# Patient Record
Sex: MALE | Race: WHITE | NOT HISPANIC OR LATINO | Employment: FULL TIME | URBAN - METROPOLITAN AREA
[De-identification: names, ages, dates, MRNs, and addresses within clinical notes are randomized per-mention and may not be internally consistent; named-entity substitution may affect disease eponyms.]

---

## 2017-03-17 ENCOUNTER — CONVERSION ENCOUNTER (OUTPATIENT)
Dept: FAMILY MEDICINE CLINIC | Facility: CLINIC | Age: 37
End: 2017-03-17

## 2017-03-17 LAB
ALBUMIN SERPL-MCNC: 4.5 G/DL (ref 3.6–5.1)
ALBUMIN/GLOB SERPL: ABNORMAL {RATIO} (ref 1–2.5)
ALP SERPL-CCNC: 75 UNITS/L (ref 40–115)
ALT SERPL-CCNC: 15 UNITS/L (ref 9–46)
AST SERPL-CCNC: 32 UNITS/L (ref 10–40)
BASOPHILS # BLD AUTO: ABNORMAL 10*3/MM3 (ref 0–200)
BASOPHILS NFR BLD AUTO: 0.9 %
BILIRUB SERPL-MCNC: 0.7 MG/DL (ref 0.2–1.2)
BILIRUB UR QL STRIP: NEGATIVE
BUN SERPL-MCNC: 12 MG/DL (ref 7–25)
BUN/CREAT SERPL: ABNORMAL (ref 6–22)
CALCIUM SERPL-MCNC: 10 MG/DL (ref 8.6–10.3)
CHLORIDE SERPL-SCNC: 105 MMOL/L (ref 98–110)
CHOLEST SERPL-MCNC: 147 MG/DL (ref 125–200)
CHOLEST/HDLC SERPL: ABNORMAL {RATIO}
CO2 CONTENT VENOUS: 28 MMOL/L (ref 20–31)
COLOR UR: YELLOW
CONV BACTERIA IN URINE MICRO: ABNORMAL /HPF
CONV HYALINE CASTS IN URINE MICRO: ABNORMAL
CONV NEUTROPHILS/100 LEUKOCYTES IN BODY FLUID BY MANUAL COUNT: 57.3 %
CONV PROTEIN IN URINE BY AUTOMATED TEST STRIP: NEGATIVE
CONV TOTAL PROTEIN: 7.6 G/DL (ref 6.1–8.1)
CREAT UR-MCNC: 0.98 MG/DL (ref 0.6–1.35)
EOSINOPHIL # BLD AUTO: 2.3 %
EOSINOPHIL # BLD AUTO: ABNORMAL 10*3/MM3 (ref 15–500)
ERYTHROCYTE [DISTWIDTH] IN BLOOD BY AUTOMATED COUNT: 13.9 % (ref 11–15)
GLOBULIN UR ELPH-MCNC: ABNORMAL G/DL (ref 1.9–3.7)
GLUCOSE SERPL-MCNC: 90 MG/DL (ref 65–99)
GLUCOSE UR QL: NEGATIVE G/DL
HCT VFR BLD AUTO: 46.1 % (ref 38.5–50)
HDLC SERPL-MCNC: 47 MG/DL
HGB BLD-MCNC: 15.3 G/DL (ref 13.2–17.1)
HGB UR QL STRIP: ABNORMAL
KETONES UR QL STRIP: NEGATIVE
LDLC SERPL CALC-MCNC: ABNORMAL MG/DL
LYMPHOCYTES # BLD AUTO: ABNORMAL 10*3/MM3 (ref 850–3900)
LYMPHOCYTES NFR BLD AUTO: 35 %
MCH RBC QN AUTO: 29.9 PG (ref 27–33)
MCHC RBC AUTO-ENTMCNC: ABNORMAL % (ref 32–36)
MCV RBC AUTO: 90.3 FL (ref 80–100)
MONOCYTES # BLD AUTO: ABNORMAL 10*3/MICROLITER (ref 200–950)
MONOCYTES NFR BLD AUTO: 4.5 %
NEUTROPHILS # BLD AUTO: ABNORMAL 10*3/MM3 (ref 1500–7800)
PH UR STRIP.AUTO: 5.5 [PH] (ref 5–8)
PLATELET # BLD AUTO: ABNORMAL 10*3/MM3 (ref 140–400)
PMV BLD AUTO: 7.3 FL (ref 7.5–12.5)
POTASSIUM SERPL-SCNC: 5.2 MMOL/L (ref 3.5–5.3)
PSA SERPL-MCNC: 0.3 NG/ML
RBC # BLD AUTO: ABNORMAL 10*6/MM3 (ref 4.2–5.8)
RBC #/AREA URNS HPF: ABNORMAL /[HPF]
SODIUM SERPL-SCNC: 141 MMOL/L (ref 135–146)
SP GR UR: 1.03 (ref 1–1.03)
SQUAMOUS #/AREA URNS HPF: ABNORMAL /HPF
TRIGL SERPL-MCNC: 77 MG/DL
WBC # BLD AUTO: ABNORMAL K/UL (ref 3.8–10.8)
WBC #/AREA URNS HPF: ABNORMAL CELLS/HPF

## 2019-06-04 ENCOUNTER — CONVERSION ENCOUNTER (OUTPATIENT)
Dept: FAMILY MEDICINE CLINIC | Facility: CLINIC | Age: 39
End: 2019-06-04

## 2019-06-04 ENCOUNTER — HOSPITAL ENCOUNTER (OUTPATIENT)
Dept: FAMILY MEDICINE CLINIC | Facility: CLINIC | Age: 39
Setting detail: SPECIMEN
Discharge: HOME | End: 2019-06-04
Attending: FAMILY MEDICINE | Admitting: FAMILY MEDICINE

## 2019-06-04 VITALS
WEIGHT: 203 LBS | OXYGEN SATURATION: 98 % | RESPIRATION RATE: 16 BRPM | HEART RATE: 85 BPM | SYSTOLIC BLOOD PRESSURE: 143 MMHG | DIASTOLIC BLOOD PRESSURE: 85 MMHG

## 2019-06-06 LAB
25(OH)D3+25(OH)D2 SERPL-MCNC: 25.1 NG/ML (ref 30–100)
ALBUMIN SERPL-MCNC: 4.9 G/DL (ref 3.5–5.5)
ALBUMIN/GLOB SERPL: 2 {RATIO} (ref 1.2–2.2)
ALP SERPL-CCNC: 73 IU/L (ref 39–117)
ALT SERPL-CCNC: 17 IU/L (ref 0–44)
AMBIG ABBREV CMP14 DEFAULT: NORMAL
AMBIG ABBREV LP DEFAULT: NORMAL
AST SERPL-CCNC: 37 IU/L (ref 0–40)
BASOPHILS # BLD AUTO: 0 X10E3/UL (ref 0–0.2)
BASOPHILS NFR BLD AUTO: 1 %
BILIRUB SERPL-MCNC: 0.7 MG/DL (ref 0–1.2)
BUN SERPL-MCNC: 11 MG/DL (ref 6–20)
BUN/CREAT SERPL: 12 (ref 9–20)
CALCIUM SERPL-MCNC: 9.4 MG/DL (ref 8.7–10.2)
CHLORIDE SERPL-SCNC: 102 MMOL/L (ref 96–106)
CHOLEST SERPL-MCNC: 134 MG/DL (ref 100–199)
CO2 SERPL-SCNC: 22 MMOL/L (ref 20–29)
CREAT SERPL-MCNC: 0.92 MG/DL (ref 0.76–1.27)
EOSINOPHIL # BLD AUTO: 0.2 X10E3/UL (ref 0–0.4)
EOSINOPHIL NFR BLD AUTO: 3 %
ERYTHROCYTE [DISTWIDTH] IN BLOOD BY AUTOMATED COUNT: 13.8 % (ref 12.3–15.4)
GLOBULIN SER CALC-MCNC: 2.4 G/DL (ref 1.5–4.5)
GLUCOSE SERPL-MCNC: 47 MG/DL (ref 65–99)
HCT VFR BLD AUTO: 44.1 % (ref 37.5–51)
HDLC SERPL-MCNC: 43 MG/DL
HGB BLD-MCNC: 14.8 G/DL (ref 13–17.7)
IMM GRANULOCYTES # BLD AUTO: 0 X10E3/UL (ref 0–0.1)
IMM GRANULOCYTES NFR BLD AUTO: 0 %
LDLC SERPL CALC-MCNC: 73 MG/DL (ref 0–99)
LYMPHOCYTES # BLD AUTO: 2 X10E3/UL (ref 0.7–3.1)
LYMPHOCYTES NFR BLD AUTO: 33 %
MCH RBC QN AUTO: 30.6 PG (ref 26.6–33)
MCHC RBC AUTO-ENTMCNC: 33.6 G/DL (ref 31.5–35.7)
MCV RBC AUTO: 91 FL (ref 79–97)
MONOCYTES # BLD AUTO: 0.4 X10E3/UL (ref 0.1–0.9)
MONOCYTES NFR BLD AUTO: 6 %
NEUTROPHILS # BLD AUTO: 3.6 X10E3/UL (ref 1.4–7)
NEUTROPHILS NFR BLD AUTO: 57 %
PLATELET # BLD AUTO: 245 X10E3/UL (ref 150–450)
POTASSIUM SERPL-SCNC: 4.4 MMOL/L (ref 3.5–5.2)
PROT SERPL-MCNC: 7.3 G/DL (ref 6–8.5)
RBC # BLD AUTO: 4.84 X10E6/UL (ref 4.14–5.8)
SODIUM SERPL-SCNC: 143 MMOL/L (ref 134–144)
TRIGL SERPL-MCNC: 88 MG/DL (ref 0–149)
TSH SERPL DL<=0.005 MIU/L-ACNC: 1.59 UIU/ML (ref 0.45–4.5)
VLDLC SERPL CALC-MCNC: 18 MG/DL (ref 5–40)
WBC # BLD AUTO: 6.2 X10E3/UL (ref 3.4–10.8)

## 2019-06-06 NOTE — PROGRESS NOTES
Visit Type:  Annual Physical  Referring Provider:  Kristine Pfeiffer MD  Primary Provider:  Kristine Pfeiffer MD    CC:  well exam.    History of Present Illness:                   This is 37 y/o male who is coming in today for his annual wellness exam. He is on lexapro 10mg and he feels that his symptoms of depression and anxiety and getting worse and he wonders if the dose of his medication can be increased. Patient   denies any chest pain, shortness of breath, dizziness, headaches, nausea, vomiting, or diarrhea. Patient denies any visual problems, weakness, numbness, or tingling. No swelling reported. Patient has good appetite and denies any weight loss. No rash   reported. He denies any urinary issues.      Past Medical History:     Reviewed history from 04/27/2017 and no changes required:        External hemorrhoids (2013)        Depression/Anxiety Disorder        Allergic Rhinitis    Past Surgical History:     Reviewed history from 09/27/2013 and no changes required:        TMJ surgery 1997        Varicocele surgery  1997        Tonsillectomy as a child        External hemorrhoidectomy (2/12/13)    Family History Summary:      Reviewed history Last on 05/09/2018 and no changes required:06/04/2019  Mother - Has Family History of Heart Disease - MVP - Entered On: 8/14/2015    General Comments - FH:  FH MVP (mother)    Social History:      with 3 children     Works [employer],      Quit smoking in 2009        Risk Factors:     Smoked Tobacco Use:  Former smoker  Smokeless Tobacco Use:  Never  Passive smoke exposure:  no  Drug use:  no  Alcohol use:  no  Seatbelt use:  100 %  Sun Exposure:  occasionally      Review of Systems     General       Denies fever, chills and fatigue.    ENT       Denies earache, nasal congestion, sore throat and Post-nasal drainage.    CV       Denies near fainting, chest pain or discomfort, racing/skipping heart beats, lightheadedness, shortness of breath  with exertion, palpitations and swelling of hands or feet.    Resp       Denies shortness of breath, chest discomfort and wheezing.    GI       Denies indigestion, nausea, vomiting and abdominal pain.           Denies dysuria, urinary frequency and urinary hesitancy.    MS       Denies joint pain, joint swelling, back pain and stiffness.    Derm       Denies dryness, itching and rash.    Neuro       Denies poor balance, headaches, numbness, tingling and weakness.    Psych       Complains of anxiety and depression.       Denies mental problems, easily irritated and frequent crying.    Endo       Denies cold intolerance, heat intolerance, excessive urination and excessive   thirst.    Heme       Denies bleeding, skin discoloration and fevers.    Allergy       Denies hives or rash and seasonal allergies.      Vital Signs:    Patient Profile:    38 Years Old Male  Height:     73.75 inches  Weight:     203 pounds  BMI:        26.24     O2 Sat:     98 %  Temp:       98.5 degrees F oral  Pulse rate: 85 / minute  Pulse rhythm:   regular  Resp:       16 per minute  BP Sittin / 85  (left arm)    Cuff size:  regular      Problems: Active problems were reviewed with the patient during this visit.  Medications: Medications were reviewed with the patient during this visit.  Allergies: Allergies were reviewed with the patient during this visit.  No Known Allergy.        Vitals Entered By: Dali NARAYAN (2019 10:59 AM)      Physical Exam    General:      well developed, well nourished, in no acute distress.    Head:      normocephalic and atraumatic.    Eyes:      PERRL/EOM intact, conjunctiva and sclera clear with out nystagmus.    Ears:      TM's intact and clear with normal canals with grossly normal hearing.    Mouth:      no deformity or lesions with good dentition.    Neck:      no masses, thyromegaly, or abnormal cervical nodes.    Lungs:      clear to auscultation bilaterally. No crackles or wheezes noted,  good air movement. No retractions or accessory muscle use.    Heart:      non-displaced PMI, chest non-tender; regular rate and rhythm, S1, S2 without murmurs, rubs, or gallops  Abdomen:      soft, non-tender, non-distended, positive bowel sounds. No rebound tenderness, no guarding. No organomegaly noted. Normal percussion.    Msk:      no deformity or scoliosis noted of thoracic or lumbar spine.    Extremities:      no pedal edema.    Neurologic:      CN II-XII gross intact.    Skin:      intact without lesions or rashes.    Cervical Nodes:      no significant adenopathy.    Psych:      alert and cooperative; normal mood and affect; normal attention span and concentration.        Blood Pressure:  Today's BP: 143/85 mm Hg    Labwork:   Most Recent Lab Results:   LDL: 85 MG/DL (CALC) mg/dL 03/16/2017    Active Medications (reviewed today):  LEXAPRO 10 MG ORAL TABLET (ESCITALOPRAM OXALATE) TAKE 1 TABLET BY MOUTH DAILY    Current Allergies (reviewed today):  No known allergies        Impression & Recommendations:    Problem # 1:  PREVENTIVE HEALTH CARE (ICD-V70.0) (JQM80-F37.00)  Assessment: Unchanged  Wellness exam was done today - see above for details. I will get labs. I will increase his Lexapro to 20mg a day. Healthy life style was discussed and encouraged today. Patient was encouraged to exercise regularly and increase PO water intake for good   health. Patient also advised to take multivitamin a day.    Orders:  [facility] VITAMIN D TOTAL (VITD)  [facility] THYROID STIMULATING HORMONE (TSH) (TSH)  [facility] CBC W/DIFF; PATH REVIEW IF INDICATED (CBC)  [facility] LIPID PANEL (LIPID)  [facility] COMPREHENSIVE METABOLIC PANEL (CMP) (MPC)  Est. Well Exam 18-39 yrs. ([address]10)      Medications Added to Medication List This Visit:  1)  Escitalopram Oxalate 20 Mg Oral Tablet (Escitalopram oxalate) .... Take 1 tablet by mouth daily        Patient Instructions:  1)  Discussed importance of regular exercise and recommended starting or continuing a regular  exercise program for good health.  2)  Please schedule a follow-up appointment in 1 year.          Medications:  ESCITALOPRAM OXALATE 20 MG ORAL TABLET (ESCITALOPRAM OXALATE) Take 1 tablet by mouth daily  #90[Tablet] x 3      Entered and Authorized by:  Kristine Pfeiffer MD      Electronically signed by:   Kristine Pfeiffer MD on 06/04/2019      Method used:    Electronically to               [id number]* (retail)              [address]              [place]  [address]              Ph: [phone]              Fax: [phone]      Note to Pharmacy: Route: ORAL;       RxID:   [id number]                Medication Administration    Orders Added:  1)  [facility] VITAMIN D TOTAL [VITD]  2)  [facility] THYROID STIMULATING HORMONE (TSH) [TSH]  3)  [facility] CBC W/DIFF; PATH REVIEW IF INDICATED [CBC]  4)  [facility] LIPID PANEL [LIPID]  5)  [facility] COMPREHENSIVE METABOLIC PANEL (CMP) [MPC]  6)  Est. Well Exam 18-39 yrs. [[id number]]        Electronically signed by Kristine Pfeiffer MD on 06/04/2019 at 12:01 PM  ________________________________________________________________________       Disclaimer: Converted Note message may not contain all data elements that existed in the legacy source system. Please see [facility] [employer] [facility] for the original note details.

## 2019-07-15 ENCOUNTER — OFFICE VISIT (OUTPATIENT)
Dept: FAMILY MEDICINE CLINIC | Facility: CLINIC | Age: 39
End: 2019-07-15

## 2019-07-15 VITALS
WEIGHT: 199 LBS | HEART RATE: 75 BPM | BODY MASS INDEX: 26.37 KG/M2 | OXYGEN SATURATION: 99 % | TEMPERATURE: 98.6 F | SYSTOLIC BLOOD PRESSURE: 130 MMHG | DIASTOLIC BLOOD PRESSURE: 83 MMHG | RESPIRATION RATE: 16 BRPM | HEIGHT: 73 IN

## 2019-07-15 DIAGNOSIS — J20.9 ACUTE BRONCHITIS, UNSPECIFIED ORGANISM: Primary | ICD-10-CM

## 2019-07-15 DIAGNOSIS — J30.2 SEASONAL ALLERGIC RHINITIS, UNSPECIFIED TRIGGER: ICD-10-CM

## 2019-07-15 DIAGNOSIS — R05.9 COUGH: ICD-10-CM

## 2019-07-15 PROBLEM — Z00.00 ENCOUNTER FOR GENERAL ADULT MEDICAL EXAMINATION WITHOUT ABNORMAL FINDINGS: Status: ACTIVE | Noted: 2018-05-09

## 2019-07-15 PROBLEM — F32.A DEPRESSION: Status: ACTIVE | Noted: 2019-07-15

## 2019-07-15 PROBLEM — F41.9 ANXIETY DISORDER: Status: ACTIVE | Noted: 2019-07-15

## 2019-07-15 PROCEDURE — 99214 OFFICE O/P EST MOD 30 MIN: CPT | Performed by: FAMILY MEDICINE

## 2019-07-15 RX ORDER — ESCITALOPRAM OXALATE 20 MG/1
20 TABLET ORAL DAILY
Refills: 2 | COMMUNITY
Start: 2019-07-03 | End: 2020-05-29

## 2019-07-15 RX ORDER — AMOXICILLIN AND CLAVULANATE POTASSIUM 875; 125 MG/1; MG/1
1 TABLET, FILM COATED ORAL 2 TIMES DAILY
Qty: 20 TABLET | Refills: 0 | Status: SHIPPED | OUTPATIENT
Start: 2019-07-15 | End: 2019-07-25

## 2019-07-15 NOTE — PROGRESS NOTES
Subjective   Chief Complaint   Patient presents with   • Cough   • Wheezing     Antonio Evans is a 39 y.o. male.     Patient Care Team:  Provider, No Known as PCP - General  Provider, No Known as PCP - Family Medicine    He is coming in today complaining about cough and congestion.  He reports that he has been sick for at least a week.  His symptoms started when he was traveling out of town.  He was seen at urgent care last week and he was diagnosed with upper respiratory infection and he was started on Z-Terence, prednisone and was given some cough medicine.  He finished his antibiotic and he does not feel any better.  In fact he says he feels worse.  He feels that his symptoms have moved to the chest.  He feels congested and feels wheezing in his chest at times.  He feels little bit achy and chilly but he denies any fever.  He also feels some congestion in his sinuses.  He denies any sore throat, nausea, vomiting, or diarrhea.  He has good appetite and he is able to drink just fine.  No rashes reported.  No sick contacts reported.         The following portions of the patient's history were reviewed and updated as appropriate: allergies, current medications, past family history, past medical history, past social history, past surgical history and problem list.  Past Medical History:   Diagnosis Date   • Allergic rhinitis    • Anxiety    • Hemorrhoids      Past Surgical History:   Procedure Laterality Date   • HEMORRHOIDECTOMY     • TEMPOROMANDIBULAR JOINT ARTHROPLASTY  1997   • TONSILLECTOMY     • VARICOCELE EXCISION  1997     The patient has a family history of  Family History   Problem Relation Age of Onset   • Mitral valve prolapse Mother      Social History     Socioeconomic History   • Marital status:      Spouse name: Not on file   • Number of children: Not on file   • Years of education: Not on file   • Highest education level: Not on file   Tobacco Use   • Smoking status: Never Smoker   • Smokeless  "tobacco: Never Used   Substance and Sexual Activity   • Alcohol use: Yes   • Drug use: No   • Sexual activity: Yes       Review of Systems   Constitutional: Positive for fatigue. Negative for activity change, appetite change, chills and fever.   HENT: Positive for congestion. Negative for ear pain, hearing loss, mouth sores, postnasal drip, sinus pressure, sore throat and voice change.    Respiratory: Positive for cough. Negative for choking, chest tightness, shortness of breath, wheezing and stridor.    Cardiovascular: Negative for chest pain.   Gastrointestinal: Negative for diarrhea, nausea and vomiting.   Skin: Negative for dry skin and rash.   Allergic/Immunologic: Positive for environmental allergies. Negative for food allergies.     Visit Vitals  /83 (BP Location: Left arm, Patient Position: Sitting, Cuff Size: Adult)   Pulse 75   Temp 98.6 °F (37 °C) (Oral)   Resp 16   Ht 185.4 cm (73\")   Wt 90.3 kg (199 lb)   SpO2 99%   BMI 26.25 kg/m²       Current Outpatient Medications:   •  amoxicillin-clavulanate (AUGMENTIN) 875-125 MG per tablet, Take 1 tablet by mouth 2 (Two) Times a Day for 10 days., Disp: 20 tablet, Rfl: 0  •  escitalopram (LEXAPRO) 20 MG tablet, Take 20 mg by mouth Daily., Disp: , Rfl: 2  •  Promethazine-Phenyleph-Codeine (PROMETHAZINE VC/CODEINE) 6.25-5-10 MG/5ML syrup, Take 5 mL by mouth 3 (Three) Times a Day As Needed (cough)., Disp: 120 mL, Rfl: 0    Objective   Physical Exam   Constitutional: He appears well-developed and well-nourished. No distress.   HENT:   Head: Normocephalic and atraumatic.   Right Ear: External ear normal.   Left Ear: External ear normal.   Mouth/Throat: Oropharynx is clear and moist. No oropharyngeal exudate.   Dry cough and congestion noted.   Eyes: Conjunctivae and EOM are normal. Pupils are equal, round, and reactive to light.   Neck: Normal range of motion. Neck supple.   Cardiovascular: Normal rate, regular rhythm, normal heart sounds and intact distal " pulses.   Pulmonary/Chest: Effort normal and breath sounds normal. No respiratory distress. He has no wheezes. He has no rales.   Skin: Skin is warm and dry. No rash noted.   Nursing note and vitals reviewed.               Lab Results   Component Value Date    GLU 47 (L) 06/05/2019    BUN 11 06/05/2019    CREATININE 0.92 06/05/2019    EGFRIFNONA 104 06/05/2019    EGFRIFAFRI 121 06/05/2019     06/05/2019    K 4.4 06/05/2019     06/05/2019    CALCIUM 9.4 06/05/2019    ALBUMIN 4.9 06/05/2019    BILITOT 0.7 06/05/2019    ALKPHOS 73 06/05/2019    AST 37 06/05/2019    ALT 17 06/05/2019    CHLPL 134 06/05/2019    TRIG 88 06/05/2019    HDL 43 06/05/2019    VLDL 18 06/05/2019    LDL 73 06/05/2019    WBC 6.2 06/05/2019    RBC 4.84 06/05/2019    HCT 44.1 06/05/2019    MCV 91 06/05/2019    MCH 30.6 06/05/2019    TSH 1.590 06/05/2019    AZDS85RW 25.1 (L) 06/05/2019          Assessment/Plan   Problems Addressed this Visit        Respiratory    Acute bronchitis - Primary    Relevant Medications    Promethazine-Phenyleph-Codeine (PROMETHAZINE VC/CODEINE) 6.25-5-10 MG/5ML syrup    Other Relevant Orders    XR Chest 2 View    Cough    Seasonal allergic rhinitis      I suspect that his symptoms are due to acute bronchitis.  He failed treatment with azithromycin and prednisone.  I will start him on Augmentin and I also gave him prescription for stronger cough medicine.  I will get a chest x-ray to rule out early pneumonia.  All this was discussed with him in details.  Cough prevents him from resting well at night so that given cough medicine should help with that.  He was advised to take plenty of rest and keep up with fluid intake.  Some of his symptoms might be possibly due to seasonal allergies which he has history off and they do not seem to be well-controlled.  He was advised to take over-the-counter allergy medications as needed.         Requested Prescriptions     Signed Prescriptions Disp Refills   •  amoxicillin-clavulanate (AUGMENTIN) 875-125 MG per tablet 20 tablet 0     Sig: Take 1 tablet by mouth 2 (Two) Times a Day for 10 days.   • Promethazine-Phenyleph-Codeine (PROMETHAZINE VC/CODEINE) 6.25-5-10 MG/5ML syrup 120 mL 0     Sig: Take 5 mL by mouth 3 (Three) Times a Day As Needed (cough).

## 2020-05-29 RX ORDER — ESCITALOPRAM OXALATE 20 MG/1
TABLET ORAL
Qty: 90 TABLET | Refills: 0 | Status: SHIPPED | OUTPATIENT
Start: 2020-05-29 | End: 2020-06-09 | Stop reason: SDUPTHER

## 2020-06-09 ENCOUNTER — OFFICE VISIT (OUTPATIENT)
Dept: FAMILY MEDICINE CLINIC | Facility: CLINIC | Age: 40
End: 2020-06-09

## 2020-06-09 VITALS
RESPIRATION RATE: 16 BRPM | TEMPERATURE: 97.8 F | BODY MASS INDEX: 26.51 KG/M2 | OXYGEN SATURATION: 97 % | WEIGHT: 200 LBS | DIASTOLIC BLOOD PRESSURE: 80 MMHG | HEART RATE: 73 BPM | HEIGHT: 73 IN | SYSTOLIC BLOOD PRESSURE: 130 MMHG

## 2020-06-09 DIAGNOSIS — Z00.00 ENCOUNTER FOR GENERAL ADULT MEDICAL EXAMINATION WITHOUT ABNORMAL FINDINGS: Primary | ICD-10-CM

## 2020-06-09 PROBLEM — J20.9 ACUTE BRONCHITIS: Status: RESOLVED | Noted: 2019-07-15 | Resolved: 2020-06-09

## 2020-06-09 PROBLEM — R05.9 COUGH: Status: RESOLVED | Noted: 2019-07-15 | Resolved: 2020-06-09

## 2020-06-09 PROCEDURE — 99396 PREV VISIT EST AGE 40-64: CPT | Performed by: FAMILY MEDICINE

## 2020-06-09 RX ORDER — ESCITALOPRAM OXALATE 20 MG/1
20 TABLET ORAL DAILY
Qty: 90 TABLET | Refills: 2 | Status: SHIPPED | OUTPATIENT
Start: 2020-06-09 | End: 2020-08-23

## 2020-06-09 NOTE — PROGRESS NOTES
Subjective   Chief Complaint   Patient presents with   • Annual Exam     Antonio Evans is a 39 y.o. male.     Patient Care Team:  Kristine Pfeiffer MD as PCP - General (Family Medicine)    He is coming in today for his annual wellness exam.  He reports doing well and denies any new problems.  He has been working from home for quite some time even before the pandemic started.  He is on Lexapro for his issues with some anxiety and depression and he feels that this medicine is still working well for him and wants to continue it.  He is trying to work on his diet and physical activity as well.  Patient denies any chest pain, shortness of breath, dizziness, nausea, vomiting, or diarrhea. No visual issues reported. No headaches, numbness or tingling. No urinary issues. Patient has good appetite and denies any weight changes. No swelling reported. No emotional issues.         The following portions of the patient's history were reviewed and updated as appropriate: allergies, current medications, past family history, past medical history, past social history, past surgical history and problem list.  Past Medical History:   Diagnosis Date   • Allergic rhinitis    • Anxiety    • Hemorrhoids      Past Surgical History:   Procedure Laterality Date   • HEMORRHOIDECTOMY     • TEMPOROMANDIBULAR JOINT ARTHROPLASTY  1997   • TONSILLECTOMY     • VARICOCELE EXCISION  1997     The patient has a family history of  Family History   Problem Relation Age of Onset   • Mitral valve prolapse Mother      Social History     Socioeconomic History   • Marital status:      Spouse name: Not on file   • Number of children: Not on file   • Years of education: Not on file   • Highest education level: Not on file   Tobacco Use   • Smoking status: Never Smoker   • Smokeless tobacco: Never Used   Substance and Sexual Activity   • Alcohol use: Yes   • Drug use: No   • Sexual activity: Yes       Review of Systems   Constitutional: Negative for  "activity change, appetite change, chills, fatigue, fever, unexpected weight gain and unexpected weight loss.   HENT: Negative for congestion, ear pain, hearing loss, nosebleeds, postnasal drip, swollen glands, tinnitus and trouble swallowing.    Eyes: Negative for blurred vision, double vision and visual disturbance.   Respiratory: Negative for cough, choking, chest tightness, shortness of breath, wheezing and stridor.    Cardiovascular: Negative for chest pain, palpitations and leg swelling.   Gastrointestinal: Negative for abdominal distention, abdominal pain, anal bleeding, constipation, diarrhea, nausea, vomiting and GERD.   Endocrine: Negative for cold intolerance, heat intolerance and polyphagia.   Genitourinary: Negative for dysuria, flank pain, frequency, hematuria and urgency.   Musculoskeletal: Negative for arthralgias, back pain, gait problem, joint swelling and neck pain.   Skin: Negative for color change, dry skin and skin lesions.   Allergic/Immunologic: Negative for environmental allergies and food allergies.   Neurological: Negative for dizziness, tremors, speech difficulty, light-headedness, numbness, headache and confusion.   Hematological: Negative for adenopathy. Does not bruise/bleed easily.   Psychiatric/Behavioral: Negative for decreased concentration, sleep disturbance, depressed mood and stress.     Visit Vitals  /80 (BP Location: Left arm, Patient Position: Sitting, Cuff Size: Large Adult)   Pulse 73   Temp 97.8 °F (36.6 °C) (Temporal)   Resp 16   Ht 185.4 cm (73\")   Wt 90.7 kg (200 lb)   SpO2 97%   BMI 26.39 kg/m²       Current Outpatient Medications:   •  escitalopram (LEXAPRO) 20 MG tablet, Take 1 tablet by mouth Daily., Disp: 90 tablet, Rfl: 2    Objective   Physical Exam   Constitutional: He is oriented to person, place, and time. He appears well-developed and well-nourished. No distress.   HENT:   Head: Normocephalic and atraumatic.   Right Ear: External ear normal.   Left Ear: " External ear normal.   Mouth/Throat: Oropharynx is clear and moist.   Eyes: Pupils are equal, round, and reactive to light. Conjunctivae and EOM are normal. Right eye exhibits no discharge. Left eye exhibits no discharge. No scleral icterus.   Neck: Normal range of motion. Neck supple. No JVD present. No thyromegaly present.   Cardiovascular: Normal rate, regular rhythm, normal heart sounds and intact distal pulses. Exam reveals no gallop and no friction rub.   No murmur heard.  Pulmonary/Chest: Effort normal and breath sounds normal. No respiratory distress. He has no wheezes. He has no rales.   Abdominal: Soft. Bowel sounds are normal. He exhibits no distension and no mass. There is no tenderness. There is no rebound and no guarding. No hernia.   Musculoskeletal: Normal range of motion. He exhibits no edema, tenderness or deformity.   Lymphadenopathy:     He has no cervical adenopathy.   Neurological: He is alert and oriented to person, place, and time. He displays normal reflexes. No cranial nerve deficit or sensory deficit.   Skin: Skin is warm and dry. Capillary refill takes less than 2 seconds. No rash noted. He is not diaphoretic. No erythema. No pallor.   Psychiatric: He has a normal mood and affect. His behavior is normal. Judgment normal.   Nursing note and vitals reviewed.           No visits with results within 7 Day(s) from this visit.   Latest known visit with results is:   Orders Only on 06/05/2019   Component Date Value Ref Range Status   • WBC 06/05/2019 6.2  3.4 - 10.8 x10E3/uL Final   • RBC 06/05/2019 4.84  4.14 - 5.80 x10E6/uL Final   • Hemoglobin 06/05/2019 14.8  13.0 - 17.7 g/dL Final   • Hematocrit 06/05/2019 44.1  37.5 - 51.0 % Final   • MCV 06/05/2019 91  79 - 97 fL Final   • MCH 06/05/2019 30.6  26.6 - 33.0 pg Final   • MCHC 06/05/2019 33.6  31.5 - 35.7 g/dL Final   • RDW 06/05/2019 13.8  12.3 - 15.4 % Final   • Platelets 06/05/2019 245  150 - 450 x10E3/uL Final   • Neutrophil Rel %  06/05/2019 57  Not Estab. % Final   • Lymphocyte Rel % 06/05/2019 33  Not Estab. % Final   • Monocyte Rel % 06/05/2019 6  Not Estab. % Final   • Eosinophil Rel % 06/05/2019 3  Not Estab. % Final   • Basophil Rel % 06/05/2019 1  Not Estab. % Final   • Neutrophils Absolute 06/05/2019 3.6  1.4 - 7.0 x10E3/uL Final   • Lymphocytes Absolute 06/05/2019 2.0  0.7 - 3.1 x10E3/uL Final   • Monocytes Absolute 06/05/2019 0.4  0.1 - 0.9 x10E3/uL Final   • Eosinophils Absolute 06/05/2019 0.2  0.0 - 0.4 x10E3/uL Final   • Basophils Absolute 06/05/2019 0.0  0.0 - 0.2 x10E3/uL Final   • Immature Granulocyte Rel % 06/05/2019 0  Not Estab. % Final   • Immature Grans Absolute 06/05/2019 0.0  0.0 - 0.1 x10E3/uL Final   • Glucose 06/05/2019 47* 65 - 99 mg/dL Final    Comment: Specimen received in contact with cells. No visible hemolysis  present. However GLUC may be decreased and K increased. Clinical  correlation indicated.     • BUN 06/05/2019 11  6 - 20 mg/dL Final   • Creatinine 06/05/2019 0.92  0.76 - 1.27 mg/dL Final   • eGFR Non African Am 06/05/2019 104  >59 mL/min/1.73 Final   • eGFR African Am 06/05/2019 121  >59 mL/min/1.73 Final   • BUN/Creatinine Ratio 06/05/2019 12  9 - 20 Final   • Sodium 06/05/2019 143  134 - 144 mmol/L Final   • Potassium 06/05/2019 4.4  3.5 - 5.2 mmol/L Final    Comment: Specimen received in contact with cells. No visible hemolysis  present. However GLUC may be decreased and K increased. Clinical  correlation indicated.     • Chloride 06/05/2019 102  96 - 106 mmol/L Final   • Total CO2 06/05/2019 22  20 - 29 mmol/L Final   • Calcium 06/05/2019 9.4  8.7 - 10.2 mg/dL Final   • Total Protein 06/05/2019 7.3  6.0 - 8.5 g/dL Final   • Albumin 06/05/2019 4.9  3.5 - 5.5 g/dL Final   • Globulin 06/05/2019 2.4  1.5 - 4.5 g/dL Final   • A/G Ratio 06/05/2019 2.0  1.2 - 2.2 Final   • Total Bilirubin 06/05/2019 0.7  0.0 - 1.2 mg/dL Final   • Alkaline Phosphatase 06/05/2019 73  39 - 117 IU/L Final   • AST (SGOT)  06/05/2019 37  0 - 40 IU/L Final   • ALT (SGPT) 06/05/2019 17  0 - 44 IU/L Final   • Total Cholesterol 06/05/2019 134  100 - 199 mg/dL Final   • Triglycerides 06/05/2019 88  0 - 149 mg/dL Final   • HDL Cholesterol 06/05/2019 43  >39 mg/dL Final   • VLDL Cholesterol 06/05/2019 18  5 - 40 mg/dL Final   • LDL Cholesterol  06/05/2019 73  0 - 99 mg/dL Final   • TSH 06/05/2019 1.590  0.450 - 4.500 uIU/mL Final   • 25 Hydroxy, Vitamin D 06/05/2019 25.1* 30.0 - 100.0 ng/mL Final    Comment: Vitamin D deficiency has been defined by the [place] [employer] and an Endocrine Society practice guideline as a  level of serum 25-OH vitamin D less than 20 ng/mL (1,2).  The Endocrine Society went on to further define vitamin D  insufficiency as a level between 21 and 29 ng/mL (2).  1. IOM ([place] [employer]). 2010. Dietary reference     intakes for calcium and D. [place]: The     National Academies Press.  2. Mary MF, Renuka NEVAREZ, Cabrera ZHOU, et al.     Evaluation, treatment, and prevention of vitamin D     deficiency: an Endocrine Society clinical practice     guideline. JCEM. 2011 Jul; 96(7):1911-30.     • Art Trujillo CMP14 Default 06/05/2019 Comment   Final    Comment: A hand-written panel/profile was received from your office. In  accordance with the [facility] Ambiguous Test Code Policy dated July 2003, we have completed your order by using the closest currently  or formerly recognized AMA panel.  We have assigned Comprehensive  Metabolic Panel (14), Test Code #084559 to this request.  If this  is not the testing you wished to receive on this specimen, please  contact the [facility] Client Inquiry/Technical Services Department  to clarify the test order.  We appreciate your business.     • Art Trujillo LP Default 06/05/2019 Comment   Final    Comment: A hand-written panel/profile was received from your office. In  accordance with the [facility] Ambiguous Test Code Policy dated July 2003, we have completed your  order by using the closest currently  or formerly recognized AMA panel.  We have assigned Lipid Panel,  Test Code #573788 to this request. If this is not the testing you  wished to receive on this specimen, please contact the [facility]  Client Inquiry/Technical Services Department to clarify the test  order.  We appreciate your business.                   Assessment/Plan   Problems Addressed this Visit        Other    Encounter for general adult medical examination without abnormal findings - Primary    Relevant Orders    CBC Auto Differential    Comprehensive Metabolic Panel    TSH    Lipid Panel        Wellness exam was done today - see above for details. Healthy life style was reviewed and discussed and re-enforced. Regular exercise and healthy diet were also discussed and recommended.  I will be getting fasting blood work.  He will continue Lexapro for the treatment of his depression anxiety has medication is working well for him and no side effects are reported.  Refill was given today.             Requested Prescriptions     Signed Prescriptions Disp Refills   • escitalopram (LEXAPRO) 20 MG tablet 90 tablet 2     Sig: Take 1 tablet by mouth Daily.

## 2020-06-22 ENCOUNTER — LAB (OUTPATIENT)
Dept: FAMILY MEDICINE CLINIC | Facility: CLINIC | Age: 40
End: 2020-06-22

## 2020-06-22 ENCOUNTER — OFFICE VISIT (OUTPATIENT)
Dept: FAMILY MEDICINE CLINIC | Facility: CLINIC | Age: 40
End: 2020-06-22

## 2020-06-22 VITALS
DIASTOLIC BLOOD PRESSURE: 78 MMHG | HEIGHT: 73 IN | TEMPERATURE: 97.7 F | OXYGEN SATURATION: 98 % | WEIGHT: 197 LBS | BODY MASS INDEX: 26.11 KG/M2 | HEART RATE: 80 BPM | RESPIRATION RATE: 16 BRPM | SYSTOLIC BLOOD PRESSURE: 128 MMHG

## 2020-06-22 DIAGNOSIS — L05.91 PILONIDAL CYST: Primary | ICD-10-CM

## 2020-06-22 LAB
ALBUMIN SERPL-MCNC: 4.8 G/DL (ref 3.5–5.2)
ALBUMIN/GLOB SERPL: 1.7 G/DL
ALP SERPL-CCNC: 71 U/L (ref 39–117)
ALT SERPL W P-5'-P-CCNC: 13 U/L (ref 1–41)
ANION GAP SERPL CALCULATED.3IONS-SCNC: 9.2 MMOL/L (ref 5–15)
AST SERPL-CCNC: 43 U/L (ref 1–40)
BASOPHILS # BLD AUTO: 0.05 10*3/MM3 (ref 0–0.2)
BASOPHILS NFR BLD AUTO: 0.7 % (ref 0–1.5)
BILIRUB SERPL-MCNC: 0.7 MG/DL (ref 0.2–1.2)
BUN BLD-MCNC: 12 MG/DL (ref 6–20)
BUN/CREAT SERPL: 11.8 (ref 7–25)
CALCIUM SPEC-SCNC: 9.4 MG/DL (ref 8.6–10.5)
CHLORIDE SERPL-SCNC: 105 MMOL/L (ref 98–107)
CHOLEST SERPL-MCNC: 150 MG/DL (ref 0–200)
CO2 SERPL-SCNC: 24.8 MMOL/L (ref 22–29)
CREAT BLD-MCNC: 1.02 MG/DL (ref 0.76–1.27)
DEPRECATED RDW RBC AUTO: 41.6 FL (ref 37–54)
EOSINOPHIL # BLD AUTO: 0.19 10*3/MM3 (ref 0–0.4)
EOSINOPHIL NFR BLD AUTO: 2.7 % (ref 0.3–6.2)
ERYTHROCYTE [DISTWIDTH] IN BLOOD BY AUTOMATED COUNT: 12.7 % (ref 12.3–15.4)
GFR SERPL CREATININE-BSD FRML MDRD: 81 ML/MIN/1.73
GLOBULIN UR ELPH-MCNC: 2.9 GM/DL
GLUCOSE BLD-MCNC: 94 MG/DL (ref 65–99)
HCT VFR BLD AUTO: 44.2 % (ref 37.5–51)
HDLC SERPL-MCNC: 43 MG/DL (ref 40–60)
HGB BLD-MCNC: 14.9 G/DL (ref 13–17.7)
IMM GRANULOCYTES # BLD AUTO: 0.02 10*3/MM3 (ref 0–0.05)
IMM GRANULOCYTES NFR BLD AUTO: 0.3 % (ref 0–0.5)
LDLC SERPL CALC-MCNC: 92 MG/DL (ref 0–100)
LDLC/HDLC SERPL: 2.13 {RATIO}
LYMPHOCYTES # BLD AUTO: 2.93 10*3/MM3 (ref 0.7–3.1)
LYMPHOCYTES NFR BLD AUTO: 41.8 % (ref 19.6–45.3)
MCH RBC QN AUTO: 30.5 PG (ref 26.6–33)
MCHC RBC AUTO-ENTMCNC: 33.7 G/DL (ref 31.5–35.7)
MCV RBC AUTO: 90.4 FL (ref 79–97)
MONOCYTES # BLD AUTO: 0.53 10*3/MM3 (ref 0.1–0.9)
MONOCYTES NFR BLD AUTO: 7.6 % (ref 5–12)
NEUTROPHILS # BLD AUTO: 3.29 10*3/MM3 (ref 1.7–7)
NEUTROPHILS NFR BLD AUTO: 46.9 % (ref 42.7–76)
NRBC BLD AUTO-RTO: 0 /100 WBC (ref 0–0.2)
PLATELET # BLD AUTO: 220 10*3/MM3 (ref 140–450)
PMV BLD AUTO: 9 FL (ref 6–12)
POTASSIUM BLD-SCNC: 4.6 MMOL/L (ref 3.5–5.2)
PROT SERPL-MCNC: 7.7 G/DL (ref 6–8.5)
RBC # BLD AUTO: 4.89 10*6/MM3 (ref 4.14–5.8)
SODIUM BLD-SCNC: 139 MMOL/L (ref 136–145)
TRIGL SERPL-MCNC: 76 MG/DL (ref 0–150)
TSH SERPL DL<=0.05 MIU/L-ACNC: 1.19 UIU/ML (ref 0.27–4.2)
VLDLC SERPL-MCNC: 15.2 MG/DL (ref 5–40)
WBC NRBC COR # BLD: 7.01 10*3/MM3 (ref 3.4–10.8)

## 2020-06-22 PROCEDURE — 80053 COMPREHEN METABOLIC PANEL: CPT | Performed by: FAMILY MEDICINE

## 2020-06-22 PROCEDURE — 36415 COLL VENOUS BLD VENIPUNCTURE: CPT | Performed by: FAMILY MEDICINE

## 2020-06-22 PROCEDURE — 80061 LIPID PANEL: CPT | Performed by: FAMILY MEDICINE

## 2020-06-22 PROCEDURE — 99213 OFFICE O/P EST LOW 20 MIN: CPT | Performed by: FAMILY MEDICINE

## 2020-06-22 PROCEDURE — 84443 ASSAY THYROID STIM HORMONE: CPT | Performed by: FAMILY MEDICINE

## 2020-06-22 PROCEDURE — 85025 COMPLETE CBC W/AUTO DIFF WBC: CPT | Performed by: FAMILY MEDICINE

## 2020-06-22 RX ORDER — SULFAMETHOXAZOLE AND TRIMETHOPRIM 800; 160 MG/1; MG/1
1 TABLET ORAL 2 TIMES DAILY
Qty: 20 TABLET | Refills: 0 | Status: SHIPPED | OUTPATIENT
Start: 2020-06-22 | End: 2020-07-02

## 2020-06-22 NOTE — PROGRESS NOTES
"Subjective   Chief Complaint   Patient presents with   • Cyst     Antonio Evans is a 39 y.o. male.     Patient Care Team:  Kristine Pfeiffer MD as PCP - General (Family Medicine)    He is coming in today as he wants to talk about the recurrent cyst he has had at the table area.  He first noted that over a month ago.  The area was painful and then later on he noted drainage and pain went away.  He again noted a recurrence of the cyst last week with some drainage and soreness continues.  He denies any other skin issues.  He denies any fevers.       The following portions of the patient's history were reviewed and updated as appropriate: allergies, current medications, past family history, past medical history, past social history, past surgical history and problem list.  Past Medical History:   Diagnosis Date   • Allergic rhinitis    • Anxiety    • Hemorrhoids      Past Surgical History:   Procedure Laterality Date   • HEMORRHOIDECTOMY     • TEMPOROMANDIBULAR JOINT ARTHROPLASTY  1997   • TONSILLECTOMY     • VARICOCELE EXCISION  1997     The patient has a family history of  Family History   Problem Relation Age of Onset   • Mitral valve prolapse Mother      Social History     Socioeconomic History   • Marital status:      Spouse name: Not on file   • Number of children: Not on file   • Years of education: Not on file   • Highest education level: Not on file   Tobacco Use   • Smoking status: Never Smoker   • Smokeless tobacco: Never Used   Substance and Sexual Activity   • Alcohol use: Yes   • Drug use: No   • Sexual activity: Yes       Review of Systems   Constitutional: Negative for chills, fatigue and fever.   Skin: Positive for skin lesions. Negative for dry skin, pallor and rash.   Hematological: Negative for adenopathy.     Visit Vitals  /78 (BP Location: Left arm, Patient Position: Sitting, Cuff Size: Large Adult)   Pulse 80   Temp 97.7 °F (36.5 °C) (Temporal)   Resp 16   Ht 185.4 cm (73\")   Wt " 89.4 kg (197 lb)   SpO2 98%   BMI 25.99 kg/m²       Current Outpatient Medications:   •  escitalopram (LEXAPRO) 20 MG tablet, Take 1 tablet by mouth Daily., Disp: 90 tablet, Rfl: 2  •  sulfamethoxazole-trimethoprim (Bactrim DS) 800-160 MG per tablet, Take 1 tablet by mouth 2 (Two) Times a Day for 10 days., Disp: 20 tablet, Rfl: 0    Objective   Physical Exam   Constitutional: He is oriented to person, place, and time. He appears well-developed and well-nourished. No distress.   HENT:   Head: Normocephalic and atraumatic.   Eyes: Pupils are equal, round, and reactive to light. Conjunctivae and EOM are normal.   Neck: Normal range of motion. Neck supple.   Pulmonary/Chest: Effort normal. No respiratory distress.   Genitourinary:   Genitourinary Comments: There is a swelling noted in the gluteal crease just about the tailbone with some palpation tenderness, no drainage.  The area is clinically consistent with inflamed and possibly infected pilonidal cyst.   Musculoskeletal: Normal range of motion.   Neurological: He is alert and oriented to person, place, and time. No cranial nerve deficit.   Skin: He is not diaphoretic.   Psychiatric: He has a normal mood and affect. Judgment and thought content normal.            No visits with results within 7 Day(s) from this visit.   Latest known visit with results is:   Orders Only on 06/05/2019   Component Date Value Ref Range Status   • WBC 06/05/2019 6.2  3.4 - 10.8 x10E3/uL Final   • RBC 06/05/2019 4.84  4.14 - 5.80 x10E6/uL Final   • Hemoglobin 06/05/2019 14.8  13.0 - 17.7 g/dL Final   • Hematocrit 06/05/2019 44.1  37.5 - 51.0 % Final   • MCV 06/05/2019 91  79 - 97 fL Final   • MCH 06/05/2019 30.6  26.6 - 33.0 pg Final   • MCHC 06/05/2019 33.6  31.5 - 35.7 g/dL Final   • RDW 06/05/2019 13.8  12.3 - 15.4 % Final   • Platelets 06/05/2019 245  150 - 450 x10E3/uL Final   • Neutrophil Rel % 06/05/2019 57  Not Estab. % Final   • Lymphocyte Rel % 06/05/2019 33  Not Estab. % Final    • Monocyte Rel % 06/05/2019 6  Not Estab. % Final   • Eosinophil Rel % 06/05/2019 3  Not Estab. % Final   • Basophil Rel % 06/05/2019 1  Not Estab. % Final   • Neutrophils Absolute 06/05/2019 3.6  1.4 - 7.0 x10E3/uL Final   • Lymphocytes Absolute 06/05/2019 2.0  0.7 - 3.1 x10E3/uL Final   • Monocytes Absolute 06/05/2019 0.4  0.1 - 0.9 x10E3/uL Final   • Eosinophils Absolute 06/05/2019 0.2  0.0 - 0.4 x10E3/uL Final   • Basophils Absolute 06/05/2019 0.0  0.0 - 0.2 x10E3/uL Final   • Immature Granulocyte Rel % 06/05/2019 0  Not Estab. % Final   • Immature Grans Absolute 06/05/2019 0.0  0.0 - 0.1 x10E3/uL Final   • Glucose 06/05/2019 47* 65 - 99 mg/dL Final    Comment: Specimen received in contact with cells. No visible hemolysis  present. However GLUC may be decreased and K increased. Clinical  correlation indicated.     • BUN 06/05/2019 11  6 - 20 mg/dL Final   • Creatinine 06/05/2019 0.92  0.76 - 1.27 mg/dL Final   • eGFR Non African Am 06/05/2019 104  >59 mL/min/1.73 Final   • eGFR African Am 06/05/2019 121  >59 mL/min/1.73 Final   • BUN/Creatinine Ratio 06/05/2019 12  9 - 20 Final   • Sodium 06/05/2019 143  134 - 144 mmol/L Final   • Potassium 06/05/2019 4.4  3.5 - 5.2 mmol/L Final    Comment: Specimen received in contact with cells. No visible hemolysis  present. However GLUC may be decreased and K increased. Clinical  correlation indicated.     • Chloride 06/05/2019 102  96 - 106 mmol/L Final   • Total CO2 06/05/2019 22  20 - 29 mmol/L Final   • Calcium 06/05/2019 9.4  8.7 - 10.2 mg/dL Final   • Total Protein 06/05/2019 7.3  6.0 - 8.5 g/dL Final   • Albumin 06/05/2019 4.9  3.5 - 5.5 g/dL Final   • Globulin 06/05/2019 2.4  1.5 - 4.5 g/dL Final   • A/G Ratio 06/05/2019 2.0  1.2 - 2.2 Final   • Total Bilirubin 06/05/2019 0.7  0.0 - 1.2 mg/dL Final   • Alkaline Phosphatase 06/05/2019 73  39 - 117 IU/L Final   • AST (SGOT) 06/05/2019 37  0 - 40 IU/L Final   • ALT (SGPT) 06/05/2019 17  0 - 44 IU/L Final   • Total  Cholesterol 06/05/2019 134  100 - 199 mg/dL Final   • Triglycerides 06/05/2019 88  0 - 149 mg/dL Final   • HDL Cholesterol 06/05/2019 43  >39 mg/dL Final   • VLDL Cholesterol 06/05/2019 18  5 - 40 mg/dL Final   • LDL Cholesterol  06/05/2019 73  0 - 99 mg/dL Final   • TSH 06/05/2019 1.590  0.450 - 4.500 uIU/mL Final   • 25 Hydroxy, Vitamin D 06/05/2019 25.1* 30.0 - 100.0 ng/mL Final    Comment: Vitamin D deficiency has been defined by the [place] of  Medicine and an Endocrine Society practice guideline as a  level of serum 25-OH vitamin D less than 20 ng/mL (1,2).  The Endocrine Society went on to further define vitamin D  insufficiency as a level between 21 and 29 ng/mL (2).  1. IOM ([place] [employer]). 2010. Dietary reference     intakes for calcium and D. [place]: The     National Academies Press.  2. Mary MF, Renuka NEVAREZ, Cabrera ZHOU, et al.     Evaluation, treatment, and prevention of vitamin D     deficiency: an Endocrine Society clinical practice     guideline. JCEM. 2011 Jul; 96(7):1911-30.     • Art Trujillo CMP14 Default 06/05/2019 Comment   Final    Comment: A hand-written panel/profile was received from your office. In  accordance with the [facility] Ambiguous Test Code Policy dated July 2003, we have completed your order by using the closest currently  or formerly recognized AMA panel.  We have assigned Comprehensive  Metabolic Panel (14), Test Code #[id number] to this request.  If this  is not the testing you wished to receive on this specimen, please  contact the [facility] Client Inquiry/Technical Services Department  to clarify the test order.  We appreciate your business.     • Art Trujillo LP Default 06/05/2019 Comment   Final    Comment: A hand-written panel/profile was received from your office. In  accordance with the [facility] Ambiguous Test Code Policy dated July 2003, we have completed your order by using the closest currently  or formerly recognized AMA panel.  We have assigned  Lipid Panel,  Test Code #[id number] to this request. If this is not the testing you  wished to receive on this specimen, please contact the [facility]  Client Inquiry/Technical Services Department to clarify the test  order.  We appreciate your business.                   Assessment/Plan   Problems Addressed this Visit        Musculoskeletal and Integument    Pilonidal cyst - Primary    Relevant Orders    Ambulatory Referral to General Surgery        I will be starting him on antibiotic and I will refer him to see a surgeon for evaluation and possible cyst removal.  Skin care was discussed.             Requested Prescriptions     Signed Prescriptions Disp Refills   • sulfamethoxazole-trimethoprim (Bactrim DS) 800-160 MG per tablet 20 tablet 0     Sig: Take 1 tablet by mouth 2 (Two) Times a Day for 10 days.

## 2020-07-06 ENCOUNTER — OFFICE VISIT (OUTPATIENT)
Dept: SURGERY | Facility: CLINIC | Age: 40
End: 2020-07-06

## 2020-07-06 VITALS
TEMPERATURE: 98.2 F | OXYGEN SATURATION: 97 % | DIASTOLIC BLOOD PRESSURE: 77 MMHG | HEART RATE: 87 BPM | WEIGHT: 201.2 LBS | BODY MASS INDEX: 26.66 KG/M2 | SYSTOLIC BLOOD PRESSURE: 117 MMHG | HEIGHT: 73 IN

## 2020-07-06 DIAGNOSIS — L05.91 PILONIDAL CYST: Primary | ICD-10-CM

## 2020-07-06 PROCEDURE — 99204 OFFICE O/P NEW MOD 45 MIN: CPT | Performed by: SURGERY

## 2020-07-06 RX ORDER — AMOXICILLIN AND CLAVULANATE POTASSIUM 875; 125 MG/1; MG/1
1 TABLET, FILM COATED ORAL 2 TIMES DAILY
Qty: 28 TABLET | Refills: 0 | Status: SHIPPED | OUTPATIENT
Start: 2020-07-06 | End: 2020-07-31

## 2020-07-29 ENCOUNTER — OFFICE VISIT (OUTPATIENT)
Dept: SURGERY | Facility: CLINIC | Age: 40
End: 2020-07-29

## 2020-07-29 VITALS
HEIGHT: 73 IN | DIASTOLIC BLOOD PRESSURE: 82 MMHG | RESPIRATION RATE: 18 BRPM | HEART RATE: 72 BPM | BODY MASS INDEX: 26.64 KG/M2 | SYSTOLIC BLOOD PRESSURE: 134 MMHG | WEIGHT: 201 LBS | TEMPERATURE: 97.1 F | OXYGEN SATURATION: 100 %

## 2020-07-29 DIAGNOSIS — K61.0 PERIANAL ABSCESS: Primary | ICD-10-CM

## 2020-07-29 PROCEDURE — 99213 OFFICE O/P EST LOW 20 MIN: CPT | Performed by: SURGERY

## 2020-07-29 RX ORDER — CHLORHEXIDINE GLUCONATE 0.12 MG/ML
15 RINSE ORAL EVERY 12 HOURS SCHEDULED
Status: CANCELLED | OUTPATIENT
Start: 2020-07-29

## 2020-07-29 RX ORDER — SODIUM CHLORIDE 9 MG/ML
100 INJECTION, SOLUTION INTRAVENOUS CONTINUOUS
Status: CANCELLED | OUTPATIENT
Start: 2020-07-29

## 2020-07-30 NOTE — PROGRESS NOTES
GENERAL SURGERY PROGRESS NOTE    7/30/2020    Patient Care Team:  Kristine Pfeiffer MD as PCP - General (Family Medicine)    Chief Complaint: perianal drainage    Subjective   HPI: per 7/6/2020: Patient is a 39 y.o. male presents with a pilonidal cyst which is infected.  The patient states that he first noticed it a few months ago.  Over the last month or so he is noticed that the area has become more swollen, tender, and erythematous.  Approximately a week ago he noticed that the area had some drainage of pus.  It was at this point that he brought this to the attention of his primary care physician who ordered antibiotics.  He has been using a heating pad intermittently to aid with the expression of purulent drainage from the opening deep within the cleft of his gluteal fold.  He denies having fevers, difficulty passing bowel movements.     Interval History:   Patient states he continues to have purulent drainage with swelling and discomfort despite completion of abx.  No difficulty passing bowel movements with diarrhea or constipation.  Afebrile.    Objective     Vital Signs       Physical Exam   Constitutional: He appears well-developed and well-nourished.   HENT:   Head: Normocephalic and atraumatic.   Eyes: Pupils are equal, round, and reactive to light. EOM are normal.   Cardiovascular: Normal rate and regular rhythm.   Pulmonary/Chest: Effort normal and breath sounds normal.   Abdominal: Soft. He exhibits no distension. There is no tenderness.   Genitourinary:   Genitourinary Comments: Approximately 3 cm away from the anus, just to the right of midline the patient has a pinpoint opening which is actively draining a moderate amount of purulent fluid.  There is no surrounding erythema, but there is some induration in that area.  The area is tender to palpation.  On digital rectal exam there are no significant masses, no significant hemorrhoidal prolapse, and no evidence of any bleeding.   Musculoskeletal:  Normal range of motion. He exhibits no edema.   Neurological: He is alert.   Skin: Skin is warm and dry. No rash noted. No erythema.   Psychiatric: He has a normal mood and affect. His behavior is normal.   Vitals reviewed.       Results Review:    Lab Results (last 24 hours)     ** No results found for the last 24 hours. **        Imaging Results (Last 24 Hours)     ** No results found for the last 24 hours. **           I have reviewed the above results and noted them below    Medication Review:    Current Outpatient Medications:   •  escitalopram (LEXAPRO) 20 MG tablet, Take 1 tablet by mouth Daily., Disp: 90 tablet, Rfl: 2  •  amoxicillin-clavulanate (Augmentin) 875-125 MG per tablet, Take 1 tablet by mouth 2 (Two) Times a Day., Disp: 28 tablet, Rfl: 0    Assessment/Plan     Active Problems:  Perianal abscess with fistula    On my exam today, the area appears to be less of a pilonidal cyst and more likely to be a perianal abscess with fistula.    I discussed with the patient that the next step should be to proceed with an exam under anesthesia with possible incision and drainage of this abscess and/or placement of a seton.  We discussed the risks, benefits, and alternatives which include bleeding, infection, damage to the sphincter complex, and recurrence.  The patient understands, and agrees to proceed as above.  Continue warm sits baths in the interim    Martin Lovett MD  07/30/20  11:24

## 2020-08-04 ENCOUNTER — LAB (OUTPATIENT)
Dept: LAB | Facility: HOSPITAL | Age: 40
End: 2020-08-04

## 2020-08-04 DIAGNOSIS — K61.0 PERIANAL ABSCESS: ICD-10-CM

## 2020-08-04 LAB
ALBUMIN SERPL-MCNC: 4.7 G/DL (ref 3.5–5.2)
ALBUMIN/GLOB SERPL: 1.6 G/DL
ALP SERPL-CCNC: 78 U/L (ref 39–117)
ALT SERPL W P-5'-P-CCNC: 14 U/L (ref 1–41)
ANION GAP SERPL CALCULATED.3IONS-SCNC: 6.8 MMOL/L (ref 5–15)
AST SERPL-CCNC: 35 U/L (ref 1–40)
BASOPHILS # BLD AUTO: 0.05 10*3/MM3 (ref 0–0.2)
BASOPHILS NFR BLD AUTO: 0.6 % (ref 0–1.5)
BILIRUB SERPL-MCNC: 0.7 MG/DL (ref 0–1.2)
BUN SERPL-MCNC: 11 MG/DL (ref 6–20)
BUN/CREAT SERPL: 10.1 (ref 7–25)
CALCIUM SPEC-SCNC: 9.9 MG/DL (ref 8.6–10.5)
CHLORIDE SERPL-SCNC: 105 MMOL/L (ref 98–107)
CO2 SERPL-SCNC: 28.2 MMOL/L (ref 22–29)
CREAT SERPL-MCNC: 1.09 MG/DL (ref 0.76–1.27)
DEPRECATED RDW RBC AUTO: 44.7 FL (ref 37–54)
EOSINOPHIL # BLD AUTO: 0.17 10*3/MM3 (ref 0–0.4)
EOSINOPHIL NFR BLD AUTO: 2.2 % (ref 0.3–6.2)
ERYTHROCYTE [DISTWIDTH] IN BLOOD BY AUTOMATED COUNT: 13.1 % (ref 12.3–15.4)
GFR SERPL CREATININE-BSD FRML MDRD: 75 ML/MIN/1.73
GLOBULIN UR ELPH-MCNC: 3 GM/DL
GLUCOSE SERPL-MCNC: 104 MG/DL (ref 65–99)
HCT VFR BLD AUTO: 45.8 % (ref 37.5–51)
HGB BLD-MCNC: 15 G/DL (ref 13–17.7)
IMM GRANULOCYTES # BLD AUTO: 0.03 10*3/MM3 (ref 0–0.05)
IMM GRANULOCYTES NFR BLD AUTO: 0.4 % (ref 0–0.5)
LYMPHOCYTES # BLD AUTO: 2.96 10*3/MM3 (ref 0.7–3.1)
LYMPHOCYTES NFR BLD AUTO: 38.4 % (ref 19.6–45.3)
MCH RBC QN AUTO: 30.1 PG (ref 26.6–33)
MCHC RBC AUTO-ENTMCNC: 32.8 G/DL (ref 31.5–35.7)
MCV RBC AUTO: 92 FL (ref 79–97)
MONOCYTES # BLD AUTO: 0.54 10*3/MM3 (ref 0.1–0.9)
MONOCYTES NFR BLD AUTO: 7 % (ref 5–12)
NEUTROPHILS NFR BLD AUTO: 3.95 10*3/MM3 (ref 1.7–7)
NEUTROPHILS NFR BLD AUTO: 51.4 % (ref 42.7–76)
NRBC BLD AUTO-RTO: 0 /100 WBC (ref 0–0.2)
PLATELET # BLD AUTO: 275 10*3/MM3 (ref 140–450)
PMV BLD AUTO: 8.8 FL (ref 6–12)
POTASSIUM SERPL-SCNC: 4.8 MMOL/L (ref 3.5–5.2)
PROT SERPL-MCNC: 7.7 G/DL (ref 6–8.5)
RBC # BLD AUTO: 4.98 10*6/MM3 (ref 4.14–5.8)
SODIUM SERPL-SCNC: 140 MMOL/L (ref 136–145)
WBC # BLD AUTO: 7.7 10*3/MM3 (ref 3.4–10.8)

## 2020-08-04 PROCEDURE — C9803 HOPD COVID-19 SPEC COLLECT: HCPCS

## 2020-08-04 PROCEDURE — U0002 COVID-19 LAB TEST NON-CDC: HCPCS

## 2020-08-04 PROCEDURE — 36415 COLL VENOUS BLD VENIPUNCTURE: CPT

## 2020-08-04 PROCEDURE — U0004 COV-19 TEST NON-CDC HGH THRU: HCPCS

## 2020-08-04 PROCEDURE — 80053 COMPREHEN METABOLIC PANEL: CPT

## 2020-08-04 PROCEDURE — 85025 COMPLETE CBC W/AUTO DIFF WBC: CPT

## 2020-08-05 ENCOUNTER — ANESTHESIA EVENT (OUTPATIENT)
Dept: PERIOP | Facility: HOSPITAL | Age: 40
End: 2020-08-05

## 2020-08-05 LAB
REF LAB TEST METHOD: NORMAL
SARS-COV-2 RNA RESP QL NAA+PROBE: NOT DETECTED

## 2020-08-06 ENCOUNTER — ANESTHESIA (OUTPATIENT)
Dept: PERIOP | Facility: HOSPITAL | Age: 40
End: 2020-08-06

## 2020-08-06 ENCOUNTER — HOSPITAL ENCOUNTER (OUTPATIENT)
Facility: HOSPITAL | Age: 40
Setting detail: HOSPITAL OUTPATIENT SURGERY
Discharge: HOME | End: 2020-08-06
Attending: SURGERY | Admitting: SURGERY

## 2020-08-06 VITALS
RESPIRATION RATE: 15 BRPM | DIASTOLIC BLOOD PRESSURE: 85 MMHG | SYSTOLIC BLOOD PRESSURE: 128 MMHG | WEIGHT: 197.75 LBS | BODY MASS INDEX: 26.21 KG/M2 | HEIGHT: 73 IN | TEMPERATURE: 97.8 F | OXYGEN SATURATION: 100 % | HEART RATE: 55 BPM

## 2020-08-06 DIAGNOSIS — K61.0 PERIANAL ABSCESS: ICD-10-CM

## 2020-08-06 PROCEDURE — 25010000002 MIDAZOLAM PER 1 MG: Performed by: NURSE ANESTHETIST, CERTIFIED REGISTERED

## 2020-08-06 PROCEDURE — 25010000002 FENTANYL CITRATE (PF) 100 MCG/2ML SOLUTION: Performed by: NURSE ANESTHETIST, CERTIFIED REGISTERED

## 2020-08-06 PROCEDURE — 46020 PLACEMENT OF SETON: CPT | Performed by: SURGERY

## 2020-08-06 PROCEDURE — 25010000002 PROPOFOL 10 MG/ML EMULSION: Performed by: NURSE ANESTHETIST, CERTIFIED REGISTERED

## 2020-08-06 PROCEDURE — 25010000002 KETOROLAC TROMETHAMINE PER 15 MG: Performed by: NURSE ANESTHETIST, CERTIFIED REGISTERED

## 2020-08-06 RX ORDER — ONDANSETRON 2 MG/ML
4 INJECTION INTRAMUSCULAR; INTRAVENOUS ONCE AS NEEDED
Status: DISCONTINUED | OUTPATIENT
Start: 2020-08-06 | End: 2020-08-06 | Stop reason: HOSPADM

## 2020-08-06 RX ORDER — MORPHINE SULFATE 4 MG/ML
2 INJECTION, SOLUTION INTRAMUSCULAR; INTRAVENOUS
Status: DISCONTINUED | OUTPATIENT
Start: 2020-08-06 | End: 2020-08-06 | Stop reason: HOSPADM

## 2020-08-06 RX ORDER — HYDRALAZINE HYDROCHLORIDE 20 MG/ML
5 INJECTION INTRAMUSCULAR; INTRAVENOUS
Status: DISCONTINUED | OUTPATIENT
Start: 2020-08-06 | End: 2020-08-06 | Stop reason: HOSPADM

## 2020-08-06 RX ORDER — SODIUM CHLORIDE 9 MG/ML
100 INJECTION, SOLUTION INTRAVENOUS CONTINUOUS
Status: DISCONTINUED | OUTPATIENT
Start: 2020-08-06 | End: 2020-08-06 | Stop reason: HOSPADM

## 2020-08-06 RX ORDER — CHLORHEXIDINE GLUCONATE 0.12 MG/ML
15 RINSE ORAL EVERY 12 HOURS SCHEDULED
Status: DISCONTINUED | OUTPATIENT
Start: 2020-08-06 | End: 2020-08-06 | Stop reason: HOSPADM

## 2020-08-06 RX ORDER — ACETAMINOPHEN 650 MG/1
650 SUPPOSITORY RECTAL ONCE AS NEEDED
Status: DISCONTINUED | OUTPATIENT
Start: 2020-08-06 | End: 2020-08-06 | Stop reason: HOSPADM

## 2020-08-06 RX ORDER — HYDROMORPHONE HCL 110MG/55ML
0.5 PATIENT CONTROLLED ANALGESIA SYRINGE INTRAVENOUS
Status: DISCONTINUED | OUTPATIENT
Start: 2020-08-06 | End: 2020-08-06 | Stop reason: HOSPADM

## 2020-08-06 RX ORDER — ACETAMINOPHEN 325 MG/1
650 TABLET ORAL ONCE AS NEEDED
Status: DISCONTINUED | OUTPATIENT
Start: 2020-08-06 | End: 2020-08-06 | Stop reason: HOSPADM

## 2020-08-06 RX ORDER — MEPERIDINE HYDROCHLORIDE 25 MG/ML
12.5 INJECTION INTRAMUSCULAR; INTRAVENOUS; SUBCUTANEOUS
Status: DISCONTINUED | OUTPATIENT
Start: 2020-08-06 | End: 2020-08-06 | Stop reason: HOSPADM

## 2020-08-06 RX ORDER — SODIUM CHLORIDE 0.9 % (FLUSH) 0.9 %
10 SYRINGE (ML) INJECTION AS NEEDED
Status: DISCONTINUED | OUTPATIENT
Start: 2020-08-06 | End: 2020-08-06 | Stop reason: HOSPADM

## 2020-08-06 RX ORDER — DOCUSATE SODIUM 100 MG/1
100 CAPSULE, LIQUID FILLED ORAL DAILY PRN
Qty: 30 CAPSULE | Refills: 1 | Status: SHIPPED | OUTPATIENT
Start: 2020-08-06 | End: 2021-08-06

## 2020-08-06 RX ORDER — IPRATROPIUM BROMIDE AND ALBUTEROL SULFATE 2.5; .5 MG/3ML; MG/3ML
3 SOLUTION RESPIRATORY (INHALATION) ONCE AS NEEDED
Status: DISCONTINUED | OUTPATIENT
Start: 2020-08-06 | End: 2020-08-06 | Stop reason: HOSPADM

## 2020-08-06 RX ORDER — PHENYLEPHRINE HCL IN 0.9% NACL 0.5 MG/5ML
.5-3 SYRINGE (ML) INTRAVENOUS
Status: DISCONTINUED | OUTPATIENT
Start: 2020-08-06 | End: 2020-08-06 | Stop reason: HOSPADM

## 2020-08-06 RX ORDER — MIDAZOLAM HYDROCHLORIDE 1 MG/ML
INJECTION INTRAMUSCULAR; INTRAVENOUS AS NEEDED
Status: DISCONTINUED | OUTPATIENT
Start: 2020-08-06 | End: 2020-08-06 | Stop reason: SURG

## 2020-08-06 RX ORDER — PROMETHAZINE HYDROCHLORIDE 25 MG/1
25 SUPPOSITORY RECTAL ONCE AS NEEDED
Status: DISCONTINUED | OUTPATIENT
Start: 2020-08-06 | End: 2020-08-06 | Stop reason: HOSPADM

## 2020-08-06 RX ORDER — PROMETHAZINE HYDROCHLORIDE 25 MG/1
25 TABLET ORAL ONCE AS NEEDED
Status: DISCONTINUED | OUTPATIENT
Start: 2020-08-06 | End: 2020-08-06 | Stop reason: HOSPADM

## 2020-08-06 RX ORDER — FENTANYL CITRATE 50 UG/ML
INJECTION, SOLUTION INTRAMUSCULAR; INTRAVENOUS AS NEEDED
Status: DISCONTINUED | OUTPATIENT
Start: 2020-08-06 | End: 2020-08-06 | Stop reason: SURG

## 2020-08-06 RX ORDER — KETOROLAC TROMETHAMINE 30 MG/ML
INJECTION, SOLUTION INTRAMUSCULAR; INTRAVENOUS AS NEEDED
Status: DISCONTINUED | OUTPATIENT
Start: 2020-08-06 | End: 2020-08-06 | Stop reason: SURG

## 2020-08-06 RX ORDER — LABETALOL HYDROCHLORIDE 5 MG/ML
5 INJECTION, SOLUTION INTRAVENOUS
Status: DISCONTINUED | OUTPATIENT
Start: 2020-08-06 | End: 2020-08-06 | Stop reason: HOSPADM

## 2020-08-06 RX ORDER — HYDROCODONE BITARTRATE AND ACETAMINOPHEN 5; 325 MG/1; MG/1
1-2 TABLET ORAL EVERY 4 HOURS PRN
Qty: 15 TABLET | Refills: 0 | Status: SHIPPED | OUTPATIENT
Start: 2020-08-06 | End: 2021-07-13

## 2020-08-06 RX ORDER — SODIUM CHLORIDE, SODIUM LACTATE, POTASSIUM CHLORIDE, CALCIUM CHLORIDE 600; 310; 30; 20 MG/100ML; MG/100ML; MG/100ML; MG/100ML
9 INJECTION, SOLUTION INTRAVENOUS CONTINUOUS PRN
Status: DISCONTINUED | OUTPATIENT
Start: 2020-08-06 | End: 2020-08-06 | Stop reason: HOSPADM

## 2020-08-06 RX ORDER — EPHEDRINE SULFATE 50 MG/ML
5 INJECTION, SOLUTION INTRAVENOUS ONCE AS NEEDED
Status: DISCONTINUED | OUTPATIENT
Start: 2020-08-06 | End: 2020-08-06 | Stop reason: HOSPADM

## 2020-08-06 RX ORDER — PROMETHAZINE HYDROCHLORIDE 25 MG/ML
6.25 INJECTION, SOLUTION INTRAMUSCULAR; INTRAVENOUS ONCE AS NEEDED
Status: DISCONTINUED | OUTPATIENT
Start: 2020-08-06 | End: 2020-08-06 | Stop reason: HOSPADM

## 2020-08-06 RX ORDER — SODIUM CHLORIDE 0.9 % (FLUSH) 0.9 %
10 SYRINGE (ML) INJECTION EVERY 12 HOURS SCHEDULED
Status: DISCONTINUED | OUTPATIENT
Start: 2020-08-06 | End: 2020-08-06 | Stop reason: HOSPADM

## 2020-08-06 RX ADMIN — PROPOFOL 200 MCG/KG/MIN: 10 INJECTION, EMULSION INTRAVENOUS at 09:12

## 2020-08-06 RX ADMIN — FENTANYL CITRATE 50 MCG: 50 INJECTION, SOLUTION INTRAMUSCULAR; INTRAVENOUS at 09:42

## 2020-08-06 RX ADMIN — FENTANYL CITRATE 50 MCG: 50 INJECTION, SOLUTION INTRAMUSCULAR; INTRAVENOUS at 09:57

## 2020-08-06 RX ADMIN — SODIUM CHLORIDE, SODIUM LACTATE, POTASSIUM CHLORIDE, AND CALCIUM CHLORIDE 9 ML/HR: 600; 310; 30; 20 INJECTION, SOLUTION INTRAVENOUS at 08:21

## 2020-08-06 RX ADMIN — PROPOFOL: 10 INJECTION, EMULSION INTRAVENOUS at 09:42

## 2020-08-06 RX ADMIN — CEFAZOLIN SODIUM 2 G: 1 INJECTION, POWDER, FOR SOLUTION INTRAMUSCULAR; INTRAVENOUS at 09:15

## 2020-08-06 RX ADMIN — MIDAZOLAM 2 MG: 1 INJECTION INTRAMUSCULAR; INTRAVENOUS at 09:12

## 2020-08-06 RX ADMIN — KETOROLAC TROMETHAMINE 30 MG: 30 INJECTION, SOLUTION INTRAMUSCULAR at 09:32

## 2020-08-06 RX ADMIN — FENTANYL CITRATE 100 MCG: 50 INJECTION, SOLUTION INTRAMUSCULAR; INTRAVENOUS at 09:12

## 2020-08-06 NOTE — ANESTHESIA PREPROCEDURE EVALUATION
Anesthesia Evaluation     Patient summary reviewed and Nursing notes reviewed   no history of anesthetic complications:  NPO Solid Status: > 8 hours  NPO Liquid Status: > 8 hours           Airway   Dental      Pulmonary    Cardiovascular         Neuro/Psych  (+) psychiatric history Anxiety and Depression,     GI/Hepatic/Renal/Endo      Musculoskeletal     Abdominal    Substance History      OB/GYN          Other        ROS/Med Hx Other: Allergies, abscess, pilonidal cyst    PSH  TEMPOROMANDIBULAR JOINT ARTHROPLASTY VARICOCELE EXCISION  TONSILLECTOMY HEMORRHOIDECTOMY                  Anesthesia Plan    ASA 2     MAC   (Patient identified; pre-operative vital signs, all relevant labs/studies, complete medical/surgical/anesthetic history, full medication list, full allergy list, and NPO status obtained/reviewed; physical assessment performed; anesthetic options, side effects, potential complications, risks, and benefits discussed; questions answered; written anesthesia consent obtained; patient cleared for procedure; anesthesia machine and equipment checked and functioning)    Anesthetic plan, all risks, benefits, and alternatives have been provided, discussed and informed consent has been obtained with: patient.    Plan discussed with CRNA.

## 2020-08-06 NOTE — OP NOTE
RECTAL EXAM UNDER ANESTHESIA  Operative Note    Patient Name:  Antonio Evans  YOB: 1980    Date of Surgery:  8/6/2020     Indications: The patient is a 40-year-old gentleman with a perianal abscess and fistula in ano.  We discussed his surgical options which include incision and drainage, or seton placement.  The patient understands and agrees to proceed to the operating room.    Pre-op Diagnosis:   Perianal abscess [K61.0]    Post-op Diagnosis:  Post-Op Diagnosis Codes:     * Perianal abscess [K61.0]    Procedure/CPT® Codes:      Procedure(s):  RECTAL EXAM UNDER ANESTHESIA  AND SETON PLACEMENT    Staff:  Surgeon(s):  Martin Lovett MD         Anesthesia: Monitored Anesthesia Care    Estimated Blood Loss: minimal    Implants:    Nothing was implanted during the procedure    Specimen:          None    Findings: External opening of the fistula on the right posterior of the anal verge approximately 2 cm away.  Internal opening at the level of the dentate line actively draining purulent fluid.    Complications: None, immediately    Description of Procedure: After obtaining informed consent in the preop holding area the patient was brought the operating room placed in the supine position.  There he underwent induction of MAC anesthesia.  SCDs were applied, preoperative antibiotics were administered.  The patient was then transferred to lithotomy position.  The anus was prepped and draped in the usual sterile fashion.  I began by performing a digital rectal exam and noted no masses within the anal canal. I then introduced a Hill-Smith retractor into the anus. Visual inspection of the anal canal demonstrated normal-appearing mucosa.  The patient did have grade 1 internal hemorrhoids.  In the posterior midline there was an opening which was actively draining purulent fluid.  The external opening noted to be just to the right of midline about 2 cm away from the anal verge in the posterior  aspect of the perianal skin was draining similar appearing fluid.  I then passed a lacrimal probe through the tract.  And then was able to place a rubber seton through this tract as well.  This was secured by tying the seton together, and then I stitched the seton together using 2-0 silk suture.  The patient tolerated the procedure well, was awoken, and taken to PACU in satisfactory condition.    Martin Lovett MD     Date: 8/6/2020  Time: 10:15

## 2020-08-06 NOTE — DISCHARGE INSTRUCTIONS
Anal Fistula    An anal fistula is a hole that develops between the bowel and the skin near the anus. The anus allows stool (feces) to leave the body. The anus has many tiny glands that make lubricating fluid. Sometimes, these glands become plugged and infected. This can cause a fluid-filled pocket (abscess) to form. An anal fistula often occurs when an abscess becomes infected and then develops into a hole between the bowel and the skin.  What are the causes?  In most cases, an anal fistula is caused by a past or current buildup of pus around the anus (anal abscess). Other causes include:  · A complication of surgery.  · Injury to the rectum or the area around it.  · Using high-energy beams (radiation) to treat the area around the rectum.  What increases the risk?  You are more likely to develop this condition if you have certain medical conditions or diseases, including:  · Chronic inflammatory bowel disease, such as Crohn's disease or ulcerative colitis.  · Colon cancer or rectal cancer.  · Diverticular disease, such as diverticulitis.  · A sexually transmitted infection, or STI, such as gonorrhea, chlamydia, or syphilis.  · An infection that is caused by HIV.  What are the signs or symptoms?  Symptoms of this condition include:  · Throbbing or constant pain that may be worse while you are sitting.  · Swelling or irritation around the anus.  · Pus or blood from an opening near the anus.  · Pain when passing stool.  · Fever or chills.  How is this diagnosed?  This condition is diagnosed based on:  · A physical exam. This may include:  ? An exam to find the external opening of the fistula.  ? An exam with a probe or scope to help locate the internal opening of the fistula.  ? An exam of the rectum with a gloved hand (digital rectal exam).  · Imaging tests that use dye to find the exact location and path of the fistula. Tests may include:  ? X-rays.  ? Ultrasound.  ? CT scan.  ? MRI.  · Other tests to find the cause  of the anal fistula.  How is this treated?  This condition is most commonly treated with surgery. The type of surgery that is used will depend on where the fistula is located and how complex the fistula is. Surgery may include:  · A fistulotomy. The whole fistula is opened up, and the contents are drained to promote healing.  · Seton placement. A silk string (seton) is placed into the fistula during a fistulotomy. This helps to drain any infection and promote healing.  · Advancement flap procedure. Tissue is removed from your rectum or the skin around the anus and attached to the opening of the fistula.  · Bioprosthetic plug. A cone-shaped plug is made from your tissue and is used to block the opening of the fistula.  Some anal fistulas do not require surgery. A nonsurgical treatment option involves injecting a fibrin glue to seal the fistula. You also may be prescribed an antibiotic medicine to treat any infection.  Follow these instructions at home:  Medicines  · Take over-the-counter and prescription medicines only as told by your health care provider.  · If you were prescribed an antibiotic medicine, take it as told by your health care provider. Do not stop taking the antibiotic even if you start to feel better.  · Use a stool softener or a laxative if told to do so by your health care provider.  General instructions    · Eat a high-fiber diet as told by your health care provider. This can help to prevent constipation.  · Drink enough fluid to keep your urine pale yellow.  · Take a warm sitz bath for 15-20 minutes, 3-4 times per day, or as told by your health care provider. Sitz baths can ease your pain and discomfort and help with healing.  · Follow good hygiene to keep the anal area as clean and dry as possible. Use wet toilet paper or a moist towelette after each bowel movement.  · Keep all follow-up visits as told by your health care provider. This is important.  Contact a health care provider if you  have:  · Increased pain that is not controlled with medicines.  · New redness or swelling around the anal area.  · New fluid, blood, or pus coming from the anal area.  · Tenderness or warmth around the anal area.  Get help right away if you have:  · A fever.  · Severe pain.  · Chills or diarrhea.  · Severe problems urinating or having a bowel movement.  Summary  · An anal fistula is a hole that develops between the bowel and the skin near the anus.  · This condition is most often caused by a buildup of pus around the anus (anal abscess). Other causes include a complication of surgery, an injury to the rectum, or the use of radiation to treat the rectal area.  · This condition is most commonly treated with surgery.  · Follow your health care provider's instructions about taking medicines, eating and drinking, or taking sitz baths.  · Call your health care provider if you have more pain, swelling, or blood. Get help right away if you have fever, severe pain, or problems passing urine or stool.  This information is not intended to replace advice given to you by your health care provider. Make sure you discuss any questions you have with your health care provider.  Document Released: 11/30/2009 Document Revised: 05/03/2019 Document Reviewed: 05/03/2019  Elsevier Patient Education © 2020 [employer]

## 2020-08-06 NOTE — ANESTHESIA POSTPROCEDURE EVALUATION
Patient: Antonio Evans    Procedure Summary     Date:  08/06/20 Room / Location:  [facility] OR 09 / [facility] MAIN OR    Anesthesia Start:  0910 Anesthesia Stop:  1021    Procedure:  RECTAL EXAM UNDER ANESTHESIA  AND SETON PLACEMENT (N/A Rectum) Diagnosis:       Perianal abscess      (Perianal abscess [K61.0])    Surgeon:  Martin Lovett MD Provider:  Ever Smith MD    Anesthesia Type:  MAC ASA Status:  2          Anesthesia Type: MAC    Vitals  Vitals Value Taken Time   /80 8/6/2020 10:49 AM   Temp 98 °F (36.7 °C) 8/6/2020 10:21 AM   Pulse 51 8/6/2020 10:49 AM   Resp 13 8/6/2020 10:36 AM   SpO2 100 % 8/6/2020 10:49 AM   Vitals shown include unvalidated device data.        Post Anesthesia Care and Evaluation    Patient location during evaluation: PACU  Patient participation: complete - patient participated  Level of consciousness: awake  Pain scale: See nurse's notes for pain score.  Pain management: adequate  Airway patency: patent  Anesthetic complications: No anesthetic complications  PONV Status: none  Cardiovascular status: acceptable  Respiratory status: acceptable  Hydration status: acceptable    Comments: Patient seen and examined postoperatively; vital signs stable; SpO2 greater than or equal to 90%; cardiopulmonary status stable; nausea/vomiting adequately controlled; pain adequately controlled; no apparent anesthesia complications; patient discharged from anesthesia care when discharge criteria were met

## 2020-08-19 ENCOUNTER — OFFICE VISIT (OUTPATIENT)
Dept: SURGERY | Facility: CLINIC | Age: 40
End: 2020-08-19

## 2020-08-19 VITALS
OXYGEN SATURATION: 99 % | DIASTOLIC BLOOD PRESSURE: 82 MMHG | BODY MASS INDEX: 26.64 KG/M2 | WEIGHT: 201 LBS | HEART RATE: 73 BPM | HEIGHT: 73 IN | TEMPERATURE: 98 F | SYSTOLIC BLOOD PRESSURE: 125 MMHG

## 2020-08-19 DIAGNOSIS — K61.0 PERIANAL ABSCESS: Primary | ICD-10-CM

## 2020-08-19 PROCEDURE — 99024 POSTOP FOLLOW-UP VISIT: CPT | Performed by: SURGERY

## 2020-08-19 RX ORDER — SULFAMETHOXAZOLE AND TRIMETHOPRIM 800; 160 MG/1; MG/1
1 TABLET ORAL 2 TIMES DAILY
Qty: 20 TABLET | Refills: 0 | Status: SHIPPED | OUTPATIENT
Start: 2020-08-19 | End: 2021-07-13

## 2020-08-19 NOTE — PROGRESS NOTES
"Post-op Note    Subjective   Antonio Evans is a 40 y.o. male with a history of perianal abscess and fistula in ano who underwent a exam under anesthesia and placement of a seton on 8/6/2020.  Patient states that at first he did not have much pain, but over the last 2 to 3 days the area around the seton has become more painful.  He has noticed increasing drainage the last 2 to 3 days from this area.  He is not on antibiotics and continues to perform sitz bath daily.      Objective   /82 (BP Location: Left arm, Patient Position: Sitting, Cuff Size: Adult)   Pulse 73   Temp 98 °F (36.7 °C) (Oral)   Ht 185.4 cm (73\")   Wt 91.2 kg (201 lb)   SpO2 99%   BMI 26.52 kg/m²   Perianal skin does not demonstrate any erythema, induration, but there is purulent drainage coming from the area of granulation tissue surrounding the seton.  Seton seems to be in good position.  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    The amount of drainage is too much to remove the seton at this time.  Recommend beginning antibiotics.  These have been sent to his pharmacy  Will see the patient back in my office in 1 week  Continue sits baths    Martin Lovett MD  8/19/2020  09:38  "

## 2020-08-23 RX ORDER — ESCITALOPRAM OXALATE 20 MG/1
20 TABLET ORAL DAILY
Qty: 90 TABLET | Refills: 2 | Status: SHIPPED | OUTPATIENT
Start: 2020-08-23 | End: 2021-06-14 | Stop reason: SDUPTHER

## 2020-08-26 ENCOUNTER — OFFICE VISIT (OUTPATIENT)
Dept: SURGERY | Facility: CLINIC | Age: 40
End: 2020-08-26

## 2020-08-26 VITALS
HEIGHT: 73 IN | HEART RATE: 78 BPM | SYSTOLIC BLOOD PRESSURE: 138 MMHG | WEIGHT: 198 LBS | OXYGEN SATURATION: 97 % | BODY MASS INDEX: 26.24 KG/M2 | TEMPERATURE: 97.1 F | DIASTOLIC BLOOD PRESSURE: 90 MMHG

## 2020-08-26 DIAGNOSIS — K61.0 PERIANAL ABSCESS: Primary | ICD-10-CM

## 2020-08-26 PROCEDURE — 99024 POSTOP FOLLOW-UP VISIT: CPT | Performed by: SURGERY

## 2020-08-27 NOTE — PROGRESS NOTES
"Post-op Note    Subjective   Antonio Evans is a 40 y.o. male with a history of perianal abscess and fistula in ano who underwent a exam under anesthesia and placement of a seton on 8/6/2020.  Continues to have drainage. On abx. Performing sitz baths. No fevers      Objective   /90   Pulse 78   Temp 97.1 °F (36.2 °C) (Temporal)   Ht 185.4 cm (73\")   Wt 89.8 kg (198 lb)   SpO2 97%   BMI 26.12 kg/m²   Perianal skin does not demonstrate any erythema, induration, but there is purulent drainage coming from the area of granulation tissue surrounding the seton.  Seton seems to be in good position.  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    The amount of drainage is too much to remove the seton at this time. Finish abx  Will see the patient back in my office in 1 week  Continue sitz baths  Drain likely to d/c next week if purulence is decreased    Martin Lovett MD  8/27/2020  14:34  "

## 2020-09-02 ENCOUNTER — OFFICE VISIT (OUTPATIENT)
Dept: SURGERY | Facility: CLINIC | Age: 40
End: 2020-09-02

## 2020-09-02 VITALS
SYSTOLIC BLOOD PRESSURE: 114 MMHG | HEART RATE: 68 BPM | TEMPERATURE: 97.1 F | BODY MASS INDEX: 26.24 KG/M2 | WEIGHT: 198 LBS | HEIGHT: 73 IN | DIASTOLIC BLOOD PRESSURE: 75 MMHG | OXYGEN SATURATION: 98 %

## 2020-09-02 DIAGNOSIS — K61.0 PERIANAL ABSCESS: Primary | ICD-10-CM

## 2020-09-02 PROCEDURE — 99024 POSTOP FOLLOW-UP VISIT: CPT | Performed by: SURGERY

## 2020-09-03 NOTE — PROGRESS NOTES
"Post-op Note    Subjective   Antonio Evans is a 40 y.o. male with a history of perianal abscess and fistula in ano who underwent a exam under anesthesia and placement of a seton on 8/6/2020.  Drainage has decreased significantly.  Still performing sitz bath's.  No fevers.  Antibiotics have been completed.      Objective   /75   Pulse 68   Temp 97.1 °F (36.2 °C) (Temporal)   Ht 185.4 cm (73\")   Wt 89.8 kg (198 lb)   SpO2 98%   BMI 26.12 kg/m²   Seton removed today.  Minimal drainage from the external opening.  External opening treated with silver nitrate.      Assessment/Plan   40-year-old gentleman with history of perianal abscess and fistula in anal who underwent an exam under anesthesia and placement of a seton on 6/8/2020, overall recovering as expected.    Discussed with patient that does not need antibiotics going forward  Can expect some drainage for up to a week or 2 after removal of the seton.  Expect that this will close on its own over time.  Follow-up with me in 1 month if still having drainage or pain in that area.    Martin Lovett MD  9/3/2020  15:19  "

## 2020-11-02 ENCOUNTER — OFFICE VISIT (OUTPATIENT)
Dept: SURGERY | Facility: CLINIC | Age: 40
End: 2020-11-02

## 2020-11-02 VITALS
DIASTOLIC BLOOD PRESSURE: 84 MMHG | RESPIRATION RATE: 18 BRPM | HEIGHT: 73 IN | BODY MASS INDEX: 27.41 KG/M2 | OXYGEN SATURATION: 100 % | TEMPERATURE: 97.7 F | HEART RATE: 73 BPM | SYSTOLIC BLOOD PRESSURE: 130 MMHG | WEIGHT: 206.8 LBS

## 2020-11-02 DIAGNOSIS — K60.3 FISTULA-IN-ANO: Primary | ICD-10-CM

## 2020-11-02 PROCEDURE — 99024 POSTOP FOLLOW-UP VISIT: CPT | Performed by: SURGERY

## 2020-11-03 NOTE — PROGRESS NOTES
GENERAL SURGERY ESTABLISHED PATIENT NOTE    Consult requested by: Patient    Patient Care Team:  Kristine Pfeiffer MD as PCP - General (Family Medicine)    Reason for consult: Continue drainage coming from his perianal area    Subjective     Patient is a 40 y.o. male presents with continued drainage coming from his perianal area.  Patient has a history of perianal abscess with fistula in ano and underwent an exam under anesthesia and placement of a seton on August 6, 2020.  His seton had been removed, and the patient was told to follow-up if he continued to have drainage coming from the area.  He reports minimal amounts of intermittent bloody drainage as well as clear drainage coming from the hole which is just posterior to his anal canal..     Review of Systems   Constitutional: Negative for chills, fatigue and fever.   Respiratory: Negative for cough and shortness of breath.    Cardiovascular: Negative for chest pain and palpitations.   Gastrointestinal: Negative for abdominal distention, abdominal pain, anal bleeding, constipation, diarrhea and rectal pain.        History  Past Medical History:   Diagnosis Date   • Allergic rhinitis    • Anxiety    • Hemorrhoids      Past Surgical History:   Procedure Laterality Date   • HEMORRHOIDECTOMY     • RECTAL EXAMINATION UNDER ANESTHESIA N/A 8/6/2020    Procedure: RECTAL EXAM UNDER ANESTHESIA  AND SETON PLACEMENT;  Surgeon: Martin Lovett MD;  Location: [facility] MAIN OR;  Service: General;  Laterality: N/A;   • TEMPOROMANDIBULAR JOINT ARTHROPLASTY  1997   • TONSILLECTOMY     • VARICOCELE EXCISION  1997     Family History   Problem Relation Age of Onset   • Mitral valve prolapse Mother      Social History     Tobacco Use   • Smoking status: Never Smoker   • Smokeless tobacco: Never Used   Substance Use Topics   • Alcohol use: Yes     Alcohol/week: 8.0 standard drinks     Types: 8 Cans of beer per week   • Drug use: No     (Not in a hospital admission)    Allergies:   Patient has no known allergies.    Objective     Vital Signs       Physical Exam  Vitals signs reviewed.   Constitutional:       General: He is not in acute distress.     Appearance: He is not ill-appearing.   HENT:      Head: Normocephalic and atraumatic.   Cardiovascular:      Rate and Rhythm: Normal rate and regular rhythm.   Pulmonary:      Effort: Pulmonary effort is normal. No respiratory distress.   Abdominal:      General: There is no distension.      Palpations: Abdomen is soft. There is no mass.      Tenderness: There is no abdominal tenderness.   Genitourinary:     Comments: On external exam, there is a pinpoint opening just to the left of midline about 2 cm away from the anal verge.  This does have a small amount of serous drainage coming from it.  There is some exposed granulation tissue near the base.  I probed this area and it does not appear to communicate with the anus at this time.  Silver nitrate was applied to the granulation tissue.  Skin:     General: Skin is warm and dry.      Findings: No erythema.   Neurological:      General: No focal deficit present.      Mental Status: He is alert and oriented to person, place, and time.   Psychiatric:         Mood and Affect: Mood normal.         Behavior: Behavior normal.         Thought Content: Thought content normal.         Judgment: Judgment normal.         Results Review:   Lab Results (last 24 hours)     ** No results found for the last 24 hours. **        No radiology results for the last day      I reviewed the patient's new imaging results and agree with the interpretation.  I reviewed the patient's other test results and agree with the interpretation    Assessment/Plan     Active Problems:  Fistula in ano    Discussed with patient that he appears to have a shallow opening where the fistulous tract has not completely closed.  I applied silver nitrate to this area and hope that this will allow the area to scar shot.  Right now the shallow  opening does not appear to communicate with the anal canal.  Recommend continued use of warm sits baths in the interim.  Follow-up with me in 1 month if not closed.      Martin Lovett MD  11/03/20  17:30 EST

## 2021-02-12 ENCOUNTER — OFFICE VISIT (OUTPATIENT)
Dept: SURGERY | Facility: CLINIC | Age: 41
End: 2021-02-12

## 2021-02-12 VITALS
BODY MASS INDEX: 27.25 KG/M2 | HEART RATE: 68 BPM | RESPIRATION RATE: 18 BRPM | DIASTOLIC BLOOD PRESSURE: 76 MMHG | TEMPERATURE: 97.7 F | WEIGHT: 205.6 LBS | OXYGEN SATURATION: 100 % | HEIGHT: 73 IN | SYSTOLIC BLOOD PRESSURE: 124 MMHG

## 2021-02-12 DIAGNOSIS — L05.91 PILONIDAL CYST: Primary | ICD-10-CM

## 2021-02-12 PROCEDURE — 99213 OFFICE O/P EST LOW 20 MIN: CPT | Performed by: SURGERY

## 2021-02-12 RX ORDER — SULFAMETHOXAZOLE AND TRIMETHOPRIM 800; 160 MG/1; MG/1
1 TABLET ORAL 2 TIMES DAILY
Qty: 20 TABLET | Refills: 0 | Status: SHIPPED | OUTPATIENT
Start: 2021-02-12 | End: 2021-07-13

## 2021-02-15 NOTE — PROGRESS NOTES
GENERAL SURGERY ESTABLISHED PATIENT NOTE    Consult requested by: Patient    Patient Care Team:  Kristine Pfeiffer MD as PCP - General (Family Medicine)    Reason for consult: Pilonidal cyst    Subjective     Patient is a 40 y.o. male presents with a new diagnosis of a pilonidal cyst.  The patient is known to me as he had a perianal abscess with a fistula in anal and subsequently underwent a rectal exam under anesthesia and seton placement in August 2020.  The patient did relatively well and had no further episodes of drainage until about a month or so again.  Now he reports that there is an area where pus is draining from a hole in his gluteal cleft.  It feels inflamed.  He is not having any fevers, nausea, vomiting, or bowel changes.  He denies any bloody bowel movements.    Review of Systems   Constitutional: Negative for appetite change, chills and fever.   HENT: Negative for congestion and sore throat.    Respiratory: Negative for cough and shortness of breath.    Cardiovascular: Negative for chest pain and palpitations.   Gastrointestinal: Negative for abdominal pain, constipation, diarrhea, nausea, vomiting and GERD.   Genitourinary: Negative for difficulty urinating, dysuria and frequency.   Musculoskeletal: Negative for arthralgias and back pain.   Skin: Positive for color change, rash and skin lesions.   Neurological: Negative for dizziness, seizures and memory problem.   Hematological: Negative for adenopathy. Does not bruise/bleed easily.   Psychiatric/Behavioral: Negative for sleep disturbance and depressed mood.        History  Past Medical History:   Diagnosis Date   • Allergic rhinitis    • Anxiety    • Hemorrhoids      Past Surgical History:   Procedure Laterality Date   • HEMORRHOIDECTOMY     • RECTAL EXAMINATION UNDER ANESTHESIA N/A 8/6/2020    Procedure: RECTAL EXAM UNDER ANESTHESIA  AND SETON PLACEMENT;  Surgeon: Martin Lovett MD;  Location: [facility] MAIN OR;  Service: General;   Laterality: N/A;   • TEMPOROMANDIBULAR JOINT ARTHROPLASTY  1997   • TONSILLECTOMY     • VARICOCELE EXCISION  1997     Family History   Problem Relation Age of Onset   • Mitral valve prolapse Mother      Social History     Tobacco Use   • Smoking status: Never Smoker   • Smokeless tobacco: Never Used   Substance Use Topics   • Alcohol use: Yes     Alcohol/week: 8.0 standard drinks     Types: 8 Cans of beer per week   • Drug use: No     (Not in a hospital admission)    Allergies:  Patient has no known allergies.    Objective     Vital Signs       Physical Exam  Vitals signs reviewed.   Constitutional:       Appearance: He is well-developed.   HENT:      Head: Normocephalic and atraumatic.   Eyes:      Pupils: Pupils are equal, round, and reactive to light.   Neck:      Musculoskeletal: Normal range of motion.   Cardiovascular:      Rate and Rhythm: Normal rate and regular rhythm.   Pulmonary:      Effort: Pulmonary effort is normal.      Breath sounds: Normal breath sounds.   Abdominal:      General: There is no distension.      Palpations: Abdomen is soft.      Tenderness: There is no abdominal tenderness.      Hernia: No hernia is present.   Genitourinary:     Comments: At the base of the sacrum, deep within the gluteal cleft there is a pinpoint opening which is actively draining a small amount of purulent fluid.  This appears to be at the midline, and is in the location of what would be considered a pilonidal cyst.  This does not communicate with the area of his prior seton placement.  There is no evidence of any erythema, fluctuance, or abscess, but there is pus indicating that there is active infection.  Musculoskeletal: Normal range of motion.   Lymphadenopathy:      Cervical: No cervical adenopathy.   Skin:     General: Skin is warm and dry.      Findings: No rash.   Neurological:      Mental Status: He is alert and oriented to person, place, and time.         Results Review:   Lab Results (last 24 hours)      ** No results found for the last 24 hours. **        No radiology results for the last day      I reviewed the patient's new imaging results and agree with the interpretation.  I reviewed the patient's other test results and agree with the interpretation    Assessment/Plan     Active Problems:  Pilonidal cyst with abscess    Discussed with the patient that he appears to have a pilonidal cyst with abscess.  This is different than his perianal abscess with fistula and a note.  The opening although draining pus does not seem to be in the same location.  Discussed with the patient that my initial impression is to attempt nonoperative management at this time.  I would recommend starting the patient on antibiotics, recommend continuing warm sits baths multiple times a day, and clipping the area free from hair.  If this fails to resolve with antibiotics and lifestyle modifications, then we may need to proceed to the operating room for excision.  Discussed with the patient that oftentimes these excisions need to be left open to heal by secondary intention, and we would attempt to avoid this if at all possible.  Will follow up with me in 1 month if he is no better.    I discussed the patients findings and my recommendations with the patient who understands and agrees.     Martin Lovett MD  02/15/21  13:58 EST

## 2021-06-14 RX ORDER — ESCITALOPRAM OXALATE 20 MG/1
20 TABLET ORAL DAILY
Qty: 30 TABLET | Refills: 0 | Status: SHIPPED | OUTPATIENT
Start: 2021-06-14 | End: 2021-07-13 | Stop reason: SDUPTHER

## 2021-07-13 ENCOUNTER — OFFICE VISIT (OUTPATIENT)
Dept: FAMILY MEDICINE CLINIC | Facility: CLINIC | Age: 41
End: 2021-07-13

## 2021-07-13 ENCOUNTER — LAB (OUTPATIENT)
Dept: FAMILY MEDICINE CLINIC | Facility: CLINIC | Age: 41
End: 2021-07-13

## 2021-07-13 VITALS
RESPIRATION RATE: 16 BRPM | HEIGHT: 73 IN | WEIGHT: 202 LBS | TEMPERATURE: 97.1 F | OXYGEN SATURATION: 97 % | DIASTOLIC BLOOD PRESSURE: 79 MMHG | HEART RATE: 72 BPM | SYSTOLIC BLOOD PRESSURE: 121 MMHG | BODY MASS INDEX: 26.77 KG/M2

## 2021-07-13 DIAGNOSIS — Z11.59 NEED FOR HEPATITIS C SCREENING TEST: ICD-10-CM

## 2021-07-13 DIAGNOSIS — Z12.5 PROSTATE CANCER SCREENING: ICD-10-CM

## 2021-07-13 DIAGNOSIS — Z00.00 PREVENTATIVE HEALTH CARE: Primary | ICD-10-CM

## 2021-07-13 LAB
25(OH)D3 SERPL-MCNC: 22.4 NG/ML (ref 30–100)
ALBUMIN SERPL-MCNC: 4.6 G/DL (ref 3.5–5.2)
ALBUMIN/GLOB SERPL: 1.8 G/DL
ALP SERPL-CCNC: 75 U/L (ref 39–117)
ALT SERPL W P-5'-P-CCNC: 12 U/L (ref 1–41)
ANION GAP SERPL CALCULATED.3IONS-SCNC: 8.7 MMOL/L (ref 5–15)
AST SERPL-CCNC: 35 U/L (ref 1–40)
BACTERIA UR QL AUTO: NORMAL /HPF
BASOPHILS # BLD AUTO: 0.06 10*3/MM3 (ref 0–0.2)
BASOPHILS NFR BLD AUTO: 0.8 % (ref 0–1.5)
BILIRUB SERPL-MCNC: 0.4 MG/DL (ref 0–1.2)
BILIRUB UR QL STRIP: NEGATIVE
BUN SERPL-MCNC: 11 MG/DL (ref 6–20)
BUN/CREAT SERPL: 12.4 (ref 7–25)
CALCIUM SPEC-SCNC: 9.2 MG/DL (ref 8.6–10.5)
CHLORIDE SERPL-SCNC: 107 MMOL/L (ref 98–107)
CHOLEST SERPL-MCNC: 157 MG/DL (ref 0–200)
CLARITY UR: CLEAR
CO2 SERPL-SCNC: 26.3 MMOL/L (ref 22–29)
COLOR UR: YELLOW
CREAT SERPL-MCNC: 0.89 MG/DL (ref 0.76–1.27)
DEPRECATED RDW RBC AUTO: 44.3 FL (ref 37–54)
EOSINOPHIL # BLD AUTO: 0.21 10*3/MM3 (ref 0–0.4)
EOSINOPHIL NFR BLD AUTO: 2.9 % (ref 0.3–6.2)
ERYTHROCYTE [DISTWIDTH] IN BLOOD BY AUTOMATED COUNT: 13.1 % (ref 12.3–15.4)
GFR SERPL CREATININE-BSD FRML MDRD: 94 ML/MIN/1.73
GLOBULIN UR ELPH-MCNC: 2.6 GM/DL
GLUCOSE SERPL-MCNC: 84 MG/DL (ref 65–99)
GLUCOSE UR STRIP-MCNC: NEGATIVE MG/DL
HCT VFR BLD AUTO: 45.9 % (ref 37.5–51)
HCV AB SER DONR QL: NORMAL
HDLC SERPL-MCNC: 47 MG/DL (ref 40–60)
HGB BLD-MCNC: 15.5 G/DL (ref 13–17.7)
HGB UR QL STRIP.AUTO: ABNORMAL
HYALINE CASTS UR QL AUTO: NORMAL /LPF
IMM GRANULOCYTES # BLD AUTO: 0.01 10*3/MM3 (ref 0–0.05)
IMM GRANULOCYTES NFR BLD AUTO: 0.1 % (ref 0–0.5)
KETONES UR QL STRIP: NEGATIVE
LDLC SERPL CALC-MCNC: 95 MG/DL (ref 0–100)
LDLC/HDLC SERPL: 2.02 {RATIO}
LEUKOCYTE ESTERASE UR QL STRIP.AUTO: NEGATIVE
LYMPHOCYTES # BLD AUTO: 2.6 10*3/MM3 (ref 0.7–3.1)
LYMPHOCYTES NFR BLD AUTO: 36.4 % (ref 19.6–45.3)
MCH RBC QN AUTO: 31.5 PG (ref 26.6–33)
MCHC RBC AUTO-ENTMCNC: 33.8 G/DL (ref 31.5–35.7)
MCV RBC AUTO: 93.3 FL (ref 79–97)
MONOCYTES # BLD AUTO: 0.53 10*3/MM3 (ref 0.1–0.9)
MONOCYTES NFR BLD AUTO: 7.4 % (ref 5–12)
NEUTROPHILS NFR BLD AUTO: 3.73 10*3/MM3 (ref 1.7–7)
NEUTROPHILS NFR BLD AUTO: 52.4 % (ref 42.7–76)
NITRITE UR QL STRIP: NEGATIVE
NRBC BLD AUTO-RTO: 0 /100 WBC (ref 0–0.2)
PH UR STRIP.AUTO: 7 [PH] (ref 5–8)
PLATELET # BLD AUTO: 268 10*3/MM3 (ref 140–450)
PMV BLD AUTO: 8.9 FL (ref 6–12)
POTASSIUM SERPL-SCNC: 4.6 MMOL/L (ref 3.5–5.2)
PROT SERPL-MCNC: 7.2 G/DL (ref 6–8.5)
PROT UR QL STRIP: NEGATIVE
PSA SERPL-MCNC: 0.34 NG/ML (ref 0–4)
RBC # BLD AUTO: 4.92 10*6/MM3 (ref 4.14–5.8)
RBC # UR: NORMAL /HPF
REF LAB TEST METHOD: NORMAL
SODIUM SERPL-SCNC: 142 MMOL/L (ref 136–145)
SP GR UR STRIP: 1.02 (ref 1–1.03)
SQUAMOUS #/AREA URNS HPF: NORMAL /HPF
TRIGL SERPL-MCNC: 76 MG/DL (ref 0–150)
TSH SERPL DL<=0.05 MIU/L-ACNC: 2.14 UIU/ML (ref 0.27–4.2)
UROBILINOGEN UR QL STRIP: ABNORMAL
VLDLC SERPL-MCNC: 15 MG/DL (ref 5–40)
WBC # BLD AUTO: 7.14 10*3/MM3 (ref 3.4–10.8)
WBC UR QL AUTO: NORMAL /HPF

## 2021-07-13 PROCEDURE — 85025 COMPLETE CBC W/AUTO DIFF WBC: CPT | Performed by: FAMILY MEDICINE

## 2021-07-13 PROCEDURE — 80061 LIPID PANEL: CPT | Performed by: FAMILY MEDICINE

## 2021-07-13 PROCEDURE — 84443 ASSAY THYROID STIM HORMONE: CPT | Performed by: FAMILY MEDICINE

## 2021-07-13 PROCEDURE — 99396 PREV VISIT EST AGE 40-64: CPT | Performed by: FAMILY MEDICINE

## 2021-07-13 PROCEDURE — 86803 HEPATITIS C AB TEST: CPT | Performed by: FAMILY MEDICINE

## 2021-07-13 PROCEDURE — 80053 COMPREHEN METABOLIC PANEL: CPT | Performed by: FAMILY MEDICINE

## 2021-07-13 PROCEDURE — 82306 VITAMIN D 25 HYDROXY: CPT | Performed by: FAMILY MEDICINE

## 2021-07-13 PROCEDURE — 36415 COLL VENOUS BLD VENIPUNCTURE: CPT | Performed by: FAMILY MEDICINE

## 2021-07-13 PROCEDURE — 81001 URINALYSIS AUTO W/SCOPE: CPT | Performed by: FAMILY MEDICINE

## 2021-07-13 PROCEDURE — G0103 PSA SCREENING: HCPCS | Performed by: FAMILY MEDICINE

## 2021-07-13 RX ORDER — ESCITALOPRAM OXALATE 20 MG/1
20 TABLET ORAL DAILY
Qty: 90 TABLET | Refills: 0 | Status: SHIPPED | OUTPATIENT
Start: 2021-07-13 | End: 2021-09-27 | Stop reason: SDUPTHER

## 2021-07-13 NOTE — PROGRESS NOTES
Subjective   Chief Complaint   Patient presents with   • Annual Exam     Antonio Evans is a 41 y.o. male.     Patient Care Team:  Kristine Pfeiffer MD as PCP - General (Family Medicine)    He is coming in today for his annual wellness exam.  He is doing well and he denies any new issues or concerns.  His lifestyle is very healthy and he eats healthy diet and tries to stay active.  He is currently on Lexapro 20 mg and has been on this medication for several years.  He would like to talk about possibly coming off the medication down the road and he will need some guidance.  He feels to be in a good spot in his life in wonders if he really needs to continue the medication.  Patient does not report any chest pain, shortness of breath, dizziness, nausea, vomiting, or diarrhea, visual issues, headaches, numbness or tingling. No urinary issues reported like urgency, frequency, or discomfort upon urination.  No significant weight changes reported.  No swelling reported.  No rashes or any other skin issues reported. No emotional issues or insomnia.         The following portions of the patient's history were reviewed and updated as appropriate: allergies, current medications, past family history, past medical history, past social history, past surgical history and problem list.  Past Medical History:   Diagnosis Date   • Allergic rhinitis    • Anxiety    • Hemorrhoids      Past Surgical History:   Procedure Laterality Date   • HEMORRHOIDECTOMY     • RECTAL EXAMINATION UNDER ANESTHESIA N/A 8/6/2020    Procedure: RECTAL EXAM UNDER ANESTHESIA  AND SETON PLACEMENT;  Surgeon: Martin Lovett MD;  Location: [facility] OR;  Service: General;  Laterality: N/A;   • TEMPOROMANDIBULAR JOINT ARTHROPLASTY  1997   • TONSILLECTOMY     • VARICOCELE EXCISION  1997     The patient has a family history of  Family History   Problem Relation Age of Onset   • Mitral valve prolapse Mother      Social History     Socioeconomic History    • Marital status:      Spouse name: Not on file   • Number of children: Not on file   • Years of education: Not on file   • Highest education level: Not on file   Tobacco Use   • Smoking status: Never Smoker   • Smokeless tobacco: Never Used   Substance and Sexual Activity   • Alcohol use: Yes     Alcohol/week: 8.0 standard drinks     Types: 8 Cans of beer per week   • Drug use: No   • Sexual activity: Yes       Review of Systems   Constitutional: Negative for activity change, appetite change, chills, fatigue, fever, unexpected weight gain and unexpected weight loss.   HENT: Negative for congestion, ear pain, hearing loss, nosebleeds, postnasal drip, swollen glands, tinnitus and trouble swallowing.    Eyes: Negative for blurred vision, double vision and visual disturbance.   Respiratory: Negative for cough, choking, chest tightness, shortness of breath, wheezing and stridor.    Cardiovascular: Negative for chest pain, palpitations and leg swelling.   Gastrointestinal: Negative for abdominal distention, abdominal pain, anal bleeding, constipation, diarrhea, nausea, vomiting and GERD.   Endocrine: Negative for cold intolerance, heat intolerance and polyphagia.   Genitourinary: Negative for dysuria, flank pain, frequency, hematuria and urgency.   Musculoskeletal: Negative for arthralgias, back pain, gait problem, joint swelling and neck pain.   Skin: Negative for color change, dry skin and skin lesions.   Allergic/Immunologic: Negative for environmental allergies and food allergies.   Neurological: Negative for dizziness, tremors, speech difficulty, light-headedness, numbness, headache and confusion.   Hematological: Negative for adenopathy. Does not bruise/bleed easily.   Psychiatric/Behavioral: Negative for decreased concentration, sleep disturbance, depressed mood and stress.     Visit Vitals  /79 (BP Location: Left arm, Patient Position: Sitting, Cuff Size: Adult)   Pulse 72   Temp 97.1 °F (36.2 °C)  "(Temporal)   Resp 16   Ht 185.4 cm (73\")   Wt 91.6 kg (202 lb)   SpO2 97%   BMI 26.65 kg/m²       Current Outpatient Medications:   •  docusate sodium (Colace) 100 MG capsule, Take 1 capsule by mouth Daily As Needed for Constipation., Disp: 30 capsule, Rfl: 1  •  escitalopram (LEXAPRO) 20 MG tablet, Take 1 tablet by mouth Daily., Disp: 90 tablet, Rfl: 0    Objective   Physical Exam  Vitals and nursing note reviewed.   Constitutional:       General: He is not in acute distress.     Appearance: He is well-developed. He is not diaphoretic.   HENT:      Head: Normocephalic and atraumatic.      Right Ear: External ear normal.      Left Ear: External ear normal.   Eyes:      General: No scleral icterus.        Right eye: No discharge.         Left eye: No discharge.      Conjunctiva/sclera: Conjunctivae normal.      Pupils: Pupils are equal, round, and reactive to light.   Neck:      Thyroid: No thyromegaly.      Vascular: No JVD.   Cardiovascular:      Rate and Rhythm: Normal rate and regular rhythm.      Heart sounds: Normal heart sounds. No murmur heard.   No friction rub. No gallop.    Pulmonary:      Effort: Pulmonary effort is normal. No respiratory distress.      Breath sounds: Normal breath sounds. No stridor. No wheezing, rhonchi or rales.   Abdominal:      General: Bowel sounds are normal. There is no distension.      Palpations: Abdomen is soft. There is no mass.      Tenderness: There is no abdominal tenderness. There is no guarding or rebound.      Hernia: No hernia is present.   Musculoskeletal:         General: No swelling, tenderness or deformity. Normal range of motion.      Cervical back: Normal range of motion and neck supple. No muscular tenderness.      Right lower leg: No edema.      Left lower leg: No edema.   Lymphadenopathy:      Cervical: No cervical adenopathy.   Skin:     General: Skin is warm and dry.      Capillary Refill: Capillary refill takes less than 2 seconds.      Coloration: Skin is " not pale.      Findings: No bruising, erythema, lesion or rash.   Neurological:      General: No focal deficit present.      Mental Status: He is alert and oriented to person, place, and time.      Cranial Nerves: No cranial nerve deficit.      Sensory: No sensory deficit.      Motor: No weakness.      Coordination: Coordination normal.      Deep Tendon Reflexes: Reflexes normal.   Psychiatric:         Mood and Affect: Mood normal.         Behavior: Behavior normal.         Judgment: Judgment normal.           Assessment/Plan   Diagnoses and all orders for this visit:    1. Preventative health care (Primary)  -     CBC Auto Differential  -     Comprehensive Metabolic Panel  -     Lipid Panel  -     Vitamin D 25 Hydroxy  -     Urinalysis With Culture If Indicated - Urine, Clean Catch  -     TSH    2. Prostate cancer screening  -     PSA Screen    3. Need for hepatitis C screening test  -     Hepatitis C Antibody    Other orders  -     escitalopram (LEXAPRO) 20 MG tablet; Take 1 tablet by mouth Daily.  Dispense: 90 tablet; Refill: 0      Wellness exam was done today - see above for details. Healthy life style was reviewed and discussed and re-enforced. Regular exercise and healthy diet were also discussed and recommended.  I will be getting fasting blood work.  He is already fully vaccinated against COVID-19.  We discussed the Lexapro which he is on due to his anxiety and depressive symptoms.  He would like to try to come off the medication.  I advised for him to start taking a half of the pill for the next few months and monitor discontinuation symptoms and also his symptoms of depression and anxiety.  All questions were answered.          Return in about 1 year (around 7/13/2022) for Annual physical.    Requested Prescriptions     Signed Prescriptions Disp Refills   • escitalopram (LEXAPRO) 20 MG tablet 90 tablet 0     Sig: Take 1 tablet by mouth Daily.

## 2021-07-19 NOTE — PROGRESS NOTES
GENERAL SURGERY CONSULTATION NOTE    Consult requested by: Dr. Pfeiffer    Patient Care Team:  Kristine Pfeiffer MD as PCP - General (Family Medicine)    Reason for consult: Pilonidal cyst    Subjective     Patient is a 39 y.o. male presents with a pilonidal cyst which is infected.  The patient states that he first noticed it a few months ago.  Over the last month or so he is noticed that the area has become more swollen, tender, and erythematous.  Approximately a week ago he noticed that the area had some drainage of pus.  It was at this point that he brought this to the attention of his primary care physician who ordered antibiotics.  He has been using a heating pad intermittently to aid with the expression of purulent drainage from the opening deep within the cleft of his gluteal fold.  He denies having fevers, difficulty passing bowel movements.     Review of Systems   Constitutional: Negative for appetite change, chills and fever.   HENT: Negative for congestion and sore throat.    Respiratory: Negative for cough and shortness of breath.    Cardiovascular: Negative for chest pain and palpitations.   Gastrointestinal: Negative for abdominal pain, constipation, diarrhea, nausea, vomiting and GERD.   Genitourinary: Negative for difficulty urinating, dysuria and frequency.   Musculoskeletal: Negative for arthralgias and back pain.   Skin: Positive for skin lesions. Negative for rash.   Neurological: Negative for dizziness, seizures and memory problem.   Hematological: Negative for adenopathy. Does not bruise/bleed easily.   Psychiatric/Behavioral: Negative for sleep disturbance and depressed mood.        History  Past Medical History:   Diagnosis Date   • Allergic rhinitis    • Anxiety    • Hemorrhoids      Past Surgical History:   Procedure Laterality Date   • HEMORRHOIDECTOMY     • TEMPOROMANDIBULAR JOINT ARTHROPLASTY  1997   • TONSILLECTOMY     • VARICOCELE EXCISION  1997     Family History   Problem Relation Age  of Onset   • Mitral valve prolapse Mother      Social History     Tobacco Use   • Smoking status: Never Smoker   • Smokeless tobacco: Never Used   Substance Use Topics   • Alcohol use: Yes   • Drug use: No       (Not in a hospital admission)  Allergies:  Patient has no known allergies.    Objective     Vital Signs       Physical Exam   Constitutional: He is oriented to person, place, and time. He appears well-developed and well-nourished.   HENT:   Head: Normocephalic and atraumatic.   Eyes: Pupils are equal, round, and reactive to light.   Neck: Normal range of motion.   Cardiovascular: Normal rate and regular rhythm.   Pulmonary/Chest: Effort normal and breath sounds normal.   Abdominal: Soft. He exhibits no distension. There is no tenderness. No hernia.   Genitourinary:   Genitourinary Comments: Deep within the gluteal fold, just to the right of midline there is a pinpoint opening within a 1 cm area of induration which is actively draining a small amount of purulent drainage.  There is minimal bloody drainage coming from the area as well.  It is mildly tender to palpation.   Musculoskeletal: Normal range of motion. He exhibits no edema.   Lymphadenopathy:     He has no cervical adenopathy.     He has no axillary adenopathy.   Neurological: He is alert and oriented to person, place, and time.   Skin: Skin is warm and dry. No rash noted.   Patient is relatively hairy all over including in his gluteal cleft   Psychiatric: He has a normal mood and affect.   Vitals reviewed.      Results Review:   Lab Results (last 24 hours)     ** No results found for the last 24 hours. **        No radiology results for the last day      I reviewed the patient's new imaging results and agree with the interpretation.  I reviewed the patient's other test results and agree with the interpretation    Assessment/Plan     Active Problems:  Pilonidal cyst with abscess    Discussed with the patient that he does have a pilonidal cyst with  abscess.  Ideally, this could be managed nonoperatively with optimal medical management strategies.  We discussed that surgical intervention is usually reserved for refractory cases to medical management, and ideally I would like to clear the infection prior to proceeding to the operating room as this would assist with wound healing and closure of the area.  Plan for continued antibiotics  3 times daily sitz bath's  Avoid sitting for long periods of time  Clipping the hair in that area  Follow-up with me in 3 weeks    Martin Lovett MD  07/07/20  14:59       denies pain/discomfort

## 2021-09-27 RX ORDER — ESCITALOPRAM OXALATE 20 MG/1
20 TABLET ORAL DAILY
Qty: 90 TABLET | Refills: 1 | Status: SHIPPED | OUTPATIENT
Start: 2021-09-27 | End: 2022-04-04

## 2021-12-26 ENCOUNTER — E-VISIT (OUTPATIENT)
Dept: FAMILY MEDICINE CLINIC | Facility: TELEHEALTH | Age: 41
End: 2021-12-26

## 2021-12-26 PROCEDURE — BRIGHTMDVISIT: Performed by: NURSE PRACTITIONER

## 2021-12-27 NOTE — EXTERNAL PATIENT INSTRUCTIONS
Diagnosis   Common cold   My name is Amy Banks, and I'm a healthcare provider at [facility]. I carefully reviewed the symptoms you reported in your interview, and I see that you may have a cold.   With the ongoing COVID-19 pandemic, it can be hard to tell the difference between the common cold and a COVID-19 infection. Many cold symptoms are the same as COVID-19 symptoms. Most people with either illness have mild to moderate symptoms and can rest at home until they get better.   To prevent the spread of illness to others, I recommend that you stay home and away from other people as much as possible while you're sick.   Here are the main things to know about your current symptoms:    Colds get better on their own.    You can use the medications I recommend here to help ease your symptoms.   Based on what you told me in your interview, I haven't prescribed any antibiotics. Antibiotics fight bacteria, not viruses. They don't help when you have a viral infection like the common cold. Antibiotics could even make you feel worse as they can cause or worsen nausea, diarrhea, and stomach pain. The following factors suggest that a virus is causing your symptoms:    You've had symptoms for less than 10 days. A bacterial infection is suspected when you've had symptoms longer than 10 days without improvement.    You don't have sinus pain.    Your glands/lymph nodes are swollen or tender to the touch. Swollen lymph nodes are common with viral conditions like yours.   Medications   Your pharmacy   [id number] [address] [place] [phone]     Prescription      Albuterol sulfate HFA (90mcg/puff): Inhale 2 puffs every 6 hours as needed for wheezing. If symptoms are severe, may use as often as every 4 hours.   Non-prescription      Guaifenesin/dextromethorphan (1200mg/60mg): Take 1 tablet by mouth twice a day as needed for cough. Brands to look for include Mucinex DM Maximum  Strength.      Oxymetazoline (0.05%): Spray 2-3 times in each nostril twice a day as needed for 3 days for nasal congestion. Do not use more than 6 sprays per nostril in a 24-hour period. Do not use for more than 3 days. Brands to look for include Afrin.      Ibuprofen (200mg): Take 2 tablets by mouth every 8 hours as needed for 10 days for any fever, pain, or discomfort associated with your condition. Do not exceed 3200mg in a 24-hour period.      Acetaminophen (325mg): Take 2 tablets by mouth every 4 hours as needed for 10 days for any fever, pain, or discomfort associated with your condition. Do not exceed 6 doses in a 24-hour period.    Although you don't have nasal stuffiness, I've included a decongestant (oxymetazoline) in your treatment plan. If your nose gets stuffy, this medication will help.   Medications won't cure the cold. However, they may help you feel better while your immune system fights the virus.   About your diagnosis   The common cold is a viral infection of the respiratory tract, which includes the nose, throat, breathing passages, and lungs. These are the key things to know about colds:    There is no vaccine to prevent colds and no cure for a cold.    Some medications can lessen your symptoms.    You can spread a cold to other people.    Over 200 different viruses can cause the common cold. That's why you can get another cold after you've just had one.   Common symptoms include low-grade fever, sneezing, runny nose, congestion, sore throat, and cough. You may also notice fatigue, body aches, difficulty sleeping, or decreased appetite.   I see that you thought your symptoms might be allergy-related. Allergy symptoms and upper respiratory infection symptoms can be very similar.   What to expect   You should feel better within 5 to 7 days and be back to normal within 14 days.   You can return to your normal activities when ALL of the following are true:    You've been fever-free for more than  24 hours without using fever-reducing medications such as Tylenol    Your other symptoms have improved    It's been at least 10 days since your symptoms first started   When to seek care   Call us at [phone]   with any sudden or unexpected symptoms.    Fever that measures over 103F or continues for more than 3 days.    A worsening headache.    Swallowing becomes extremely difficult or impossible.    Hoarse voice or loss of voice lasting longer than 2 weeks.    Sinus pain that lasts for more than 10 days, without improvement.    More than 5 episodes of diarrhea in a day.    More than 5 episodes of vomiting in a day.    Coughing up red or bloody mucus.    Severe shortness of breath.    Severe stomach pain.    Symptoms that get better for a few days, and then suddenly get worse.   You mentioned having chest pain. If you develop any of the following, call 911 or go immediately to your nearest emergency room:    A feeling of pressure on your chest    Pain that gets worse with physical activity    Pain that feels like it is radiating to the shoulders, neck, arm, or jaw   Other treatment    Rest! Your body needs rest to recover and fight the virus.    Drink plenty of water to stay hydrated.    Try non-prescription saline nasal sprays to help your nasal symptoms.    If your nose or sinuses become very stuffy, try using a Neti Pot to flush them out. Neti Pots are available at any drugstore without a prescription.    Gargle with salt water several times a day to help your throat feel better. Cough drops and throat lozenges may provide extra relief. A teaspoon of honey stirred into warm water or weak tea can help soothe a sore throat and cough.    Avoid smoke and air pollution. Smoke can make infections worse.   Prevention    Avoid close contact with other people when you're sick.    Cover your mouth and nose when you cough or sneeze. Use a tissue or cough into your elbow. Make sure that used tissues go directly into  the trash.    Avoid touching your eyes, nose, or mouth while you're sick.    Wash your hands often, especially after coughing, sneezing, or blowing your nose. If soap and water are not available, use an alcohol-based hand .    If you or someone in your home or workplace is sick, disinfect commonly used items. This includes door handles, tables, computers, remotes, and pens.    Coronavirus (COVID-19) information   Common symptoms of COVID-19 include fever, cough, shortness of breath, fatigue, muscle or body aches, headaches, new loss of sense of taste or smell, sore throat, stuffy or runny nose, nausea or vomiting, and diarrhea. Most people who get COVID-19 have mild symptoms and can rest at home until they get better. Elderly people and those with chronic medical problems may be at risk for more serious complications.   FAQs about the COVID-19 vaccine   There are three authorized COVID-19 vaccines: [employer]'s Michael Vaccine (J&J/Michael), Moderna, and Pfizer-BioNTech (Pfizer). The J&J/Michael and Moderna vaccines are approved for use in people aged 18 and older. The Pfizer vaccine is approved for those aged 5 and older. All three are available at no cost. Even if you don't have health insurance, you can still get the COVID-19 vaccine for free.   Which vaccine is the best? Which vaccine should I get?   All three vaccines are highly effective. Even if you get COVID after being vaccinated, all of the vaccines help prevent severe disease, hospitalization, and complications.   Most people should get whichever vaccine is first available to them. However, women younger than 50 years old should consider the rare risk of blood clots with low platelets after vaccination with the J&J/Michael vaccine. This risk hasn't been seen with the other two vaccines.   Are the vaccines safe?   Yes. Hundreds of millions of people in the [place] have already safely received COVID-19 vaccines. As part of Phase 3 clinical trials  in the [place] and other countries, researchers collected safety and efficacy data for all three vaccines. These clinical trials follow strict standards. Before a vaccine is approved, the manufacturing company must submit data to the Food and Drug Administration (FDA) for review. Tens of thousands of volunteers participated in the clinical trials for the vaccines. The FDA continues to monitor safety data as the vaccines are given to the general population.   Do I need the vaccine if I've already had COVID?   Yes. Vaccination helps protect you even if you've already had COVID.   If you had COVID-19 and had symptoms, wait to get vaccinated until ALL of the following are true:    10 days since your symptoms started    24 hours with no fever, without the use of fever-reducing medications    Your other COVID-19 symptoms are improving   If you tested positive for COVID-19 but did not have symptoms, you can get vaccinated after 10 days have passed since you had a positive test, as long as you don't develop symptoms.   If you had COVID-19 and were treated with monoclonal antibodies, you should wait 90 days before getting a COVID-19 vaccination.   How many doses of the vaccine do I need?   J&J/Michael: one dose.   Moderna: two doses, spaced 4 weeks apart.   Pfizer vaccine: two doses, spaced 3 weeks apart.   When am I considered fully vaccinated?   J&J/Michael: 14 days after you get the shot.   Moderna: 14 days after your second dose.   Pfizer: 14 days after your second dose.   What if I miss the second dose of the Moderna or Pfizer vaccine?   Contact your healthcare provider to discuss your options. While one dose of the vaccine may provide some protection against COVID, you need both doses for maximum protection.   What are the common side effects of the vaccine?   A sore arm, tiredness, headache, and muscle pain may occur within two days of getting the vaccine and last a day or two. For the Moderna or Pfizer vaccines, side  "effects are more common after the second dose. People over the age of 55 are less likely to have side effects than younger people.   After I'm fully vaccinated, can I still get or spread COVID?   Yes, but your disease should be milder, and your risk of serious illness, hospitalization, and complications will be much lower. And being vaccinated reduces the risk of spreading the disease if you get it.   After I'm fully vaccinated, can I go back to normal?    You should still wear a mask indoors in public if:    It's required by laws, rules, regulations, or local guidance.    You have a weakened immune system.    Your age puts you at increased risk of severe disease.    You have a medical condition that puts you at increased risk of severe disease.    Someone in your household has a weakened immune system, is at increased risk for severe disease, or is unvaccinated.    You're in an area of high transmission.    For travel information, see the CDC's latest guidance  .    Even after you're fully vaccinated, you should still:    Get tested and stay away from others if you develop symptoms of COVID-19.    Stay home and away from other private or public settings if you've tested positive for COVID-19 in the previous 10 days.    Continue to follow any applicable laws, rules, and regulations.    If you're exposed to COVID-19 after being fully vaccinated, you should get tested 3 to 5 days after exposure, even if you don't have symptoms. Wear a mask indoors in public for 14 days following exposure, or until you've confirmed your test result is negative. If you test positive for COVID-19, you should isolate at home for 10 days.   I'm fully vaccinated but have heard about a \"third dose\" and \"fourth dose.\" Do these apply to me?   Third and fourth doses are needed for some immunocompromised people.   You should get a third dose if ALL of the following are true:    You have a moderately to severely weakened immune system    You've " "had two doses of the Moderna or Pfizer vaccine    It's been at least 28 days since your second dose   You should get a fourth dose if ALL of the following are true:    You have a moderately to severely weakened immune system    You've had three doses of the Moderna or Pfizer vaccine    It's been at least 6 months since your third dose   If any of these situations apply, you have a moderately to severely weakened immune system:    You're getting active cancer treatment for a cancer or tumor of the blood    You've had an organ transplant and are taking medicine to suppress your immune system    You've had a stem cell transplant within the last 2 years    You're taking medicine to suppress your immune system, such as high-dose corticosteroids    You have moderate to severe primary immunodeficiency (such as DiGeorge syndrome, Wiskott-Searcy syndrome)    You have advanced or untreated HIV infection   If you think you need a third or fourth dose, speak with your care team.   I'm fully vaccinated but have heard about \"boosters.\" Do I need a booster shot?   Everyone 16 and older should get a booster shot. Immunity from vaccinations can decrease over time, and a booster renews the effect of the vaccination.   If you got the J&J/Michael vaccine AND it's been at least 2 months since your shot, you should get a booster shot now.   If you got the Pfizer or Moderna vaccine AND it's been at least 6 months since your second dose, you should get a booster shot now.   If you're 16 or 17 years old, you can get get the Pfizer booster.   If you're 18 or older, you can choose which vaccine to get as a booster. You can get the kind you originally received, or you can get a different kind. New CDC recommendations allow for mix-and-match dosing for booster shots. However, women younger than 50 years old should consider the rare risk of blood clots with low platelets after vaccination with the J&J/Michael vaccine. This risk hasn't been seen " with the other two vaccines.   If you'd like more information on where and how to get a booster shot, speak with your care team.   General information about COVID-19   What should I do if I'm exposed to someone with COVID-19?   In general, you need to be in close contact with someone who has COVID-19 to get infected. Close contact means:    Living in the same household as someone with COVID-19.    Caring for someone with COVID-19.    Being within 6 feet of someone with COVID-19 for a total of at least 15 minutes over a 24-hour period.    Being in direct contact with respiratory droplets from someone with COVID-19 (for example, being coughed on, kissing, or sharing utensils).   If you're exposed and not fully vaccinated:    Self-quarantine in your home for 14 days after your last known contact with the infected person. Because you can spread the disease before you have symptoms, it's very important that you stay home AT ALL TIMES, unless you need medical care. Don't go to work, school, or public places, including grocery stores and pharmacies. Avoid public transportation, ride-sharing, and taxis.    Watch for the common symptoms of COVID-19: fever, cough, shortness of breath, fatigue, muscle or body aches, headache, new loss of sense of taste or smell, sore throat, stuffy or runny nose, nausea or vomiting, and diarrhea.   What if I develop symptoms of COVID-19?   CALL your healthcare provider or clinic right away to discuss next steps if you have any of the following risk factors:    Age 65 or older    Pregnant    Chronic medical condition such as diabetes, liver disease, kidney disease requiring dialysis, heart disease, high blood pressure, severe obesity, or lung disease (including moderate to severe asthma)    A medical condition that affects your immune system    Taking a medication that affects your immune system   Otherwise, if your symptoms are mild, you don't need to call your healthcare provider or be seen  for an exam. You can recover at home and should feel better within a few weeks. Because COVID-19 is highly contagious, it's important that you avoid close contact with others while you're recovering. This means staying home AT ALL TIMES, unless you need medical care. Don't go to work, school, or public places, including grocery stores and pharmacies. Avoid public transportation, ride-sharing, and taxis.   There are currently no specific medications to treat this infection. Over-the-counter cold medications can help ease symptoms.   To prevent the spread of COVID-19 to the people and animals in your household:    Stay in a specific room away from other people in your home, and use a separate bathroom if possible.    Wear a mask when close contact with household members can't be avoided.   You can return to your normal activities when ALL of the following are true:    Your symptoms have improved    It's been at least 10 days since your symptoms first started    You've been fever-free for more than 24 hours without using fever-reducing medications such as Tylenol   When to get care   Call your healthcare provider immediately if you have any of the following:    Fever over 103F    Fever that doesn't come down after taking medications such as Tylenol or ibuprofen    Fever that returns after being gone for more than 24 hours    Fever lasting more than 4 days    Worsening shortness of breath or difficulty breathing   Go to your nearest ER or call 911 if you have any of the following:    Shortness of breath that makes it hard to do simple things like get dressed, bathe, or comb your hair    Persistent chest pain or chest tightness    New confusion or difficulty staying alert    Bluish color to the lips or face    Flu vaccine information   Getting a flu vaccine this year is more important than ever. The vaccine not only protects you and the people around you from the flu, it also helps reduce the strain on healthcare systems  responding to the COVID-19 pandemic.   Who should get a flu vaccine?   Everyone 6 months of age and older should get a yearly flu vaccine.   When should I get vaccinated?   You should get a flu vaccine by the end of October. Once you're vaccinated, it takes about two weeks for antibodies to develop and protect you against the flu. That's why it's important to get vaccinated as soon as possible.   After October, is it too late to get vaccinated?   No. You should still get vaccinated. As long as the flu viruses are still in your community, flu vaccines will remain available, even into January of next year or later.   Why do I need a flu vaccine EVERY year?   Flu viruses are constantly changing, so flu vaccines are usually updated from one season to the next. Your protection from the flu vaccine also lessens over time.   Is the flu vaccine safe?   Yes. Over the last 50 years, hundreds of millions of Americans have safely received the flu vaccines.   What are the side effects of flu vaccines?   You CANNOT get the flu from a flu vaccine. Common side effects of the flu shot include soreness, redness and/or swelling where the shot was given, low grade fever, and aches. Common side effects of the nasal spray flu vaccine for adults include runny nose, headaches, sore throat, and cough. For children, side effects include wheezing, vomiting, muscle aches, and fever.   Does the flu vaccine increase your risk of getting COVID-19?   No. There is no evidence that getting a flu vaccine increases your risk of getting COVID-19.   Is it safe to get the flu vaccine along with a COVID-19 vaccine?   Yes. It's safe to get the flu vaccine with a COVID vaccine or booster.   Contact your healthcare provider TODAY for details on when and where to get your flu vaccine.   Your provider   Your diagnosis was provided by Amy Banks, a member of your trusted care team at [facility].   If you have any questions, call us at [phone]   .

## 2021-12-27 NOTE — E-VISIT TREATED
Chief Complaint: Coronavirus (COVID-19), cold, sinus pain, allergy, or flu   Patient introduction   Patient is 41-year-old male who reports cough, rhinitis, and fatigue that started 6-9 days ago.   Patient has not requested COVID testing.   Coronavirus Disease 2019 (COVID-19) exposure, testing history, and vaccination status:    No known exposure to a confirmed or suspected case of COVID-19.    No recent travel outside of their local community.    Patient had a viral test within the last 7 days. Test result was negative.    Reports receiving 2 doses.    Received the J&J/Tribe vaccine for the first dose.    Received the Moderna vaccine for the second dose.    Received their most recent dose of the vaccine > 14 days ago.   When asked why they're seeking care online today, patient reports they want to know if they have a cold or something more serious.   Patient-submitted comments:   Patient writes: coughing throughout the day and night. Runny nose. Feeling tired and achey. Had a Covid test 5 days after symptoms started, was negative. That was last week. Still feeling lousy..   Patient did not request an excuse note.   General presentation   Patient denies improvement of symptoms. Symptoms came on gradually.   Fever:    Denies fever.   Sinus and nasal symptoms:    Reports rhinitis.    Reports yellow nasal drainage.    Nasal drainage is thick.    Reports postnasal drip.    Denies nasal or sinus congestion.    Denies itchy nose or sneezing.   Sore throat:    Denies sore throat.   Head and body aches:    Reports fatigue.    Denies headache.    Denies sweats.    Denies chills.    Denies myalgia.   Dizziness:    Denies dizziness.   Cough:    Reports cough.    Cough is worse in the morning, during the day, and at night/while sleeping.    Cough is not productive of sputum.   Wheezing and SOB:    Reports wheezing.    Reports previous albuterol inhaler use during URIs, bronchitis, or pneumonia.    Reports previous steroid  inhaler use during URIs, bronchitis, or pneumonia.    Not using an albuterol inhaler for current symptoms because they don't have any available.    Denies COPD diagnosis.    Denies asthma diagnosis.    Denies shortness of breath.   Chest pain:    Reports chest pain, but only when coughing.    [place] Heart Score (MHS): 0, low risk of CAD. Assigning 1 point to each of 5 criteria (female >= 65 years old or male >= 55 years, known CAD, pain worse with exercise, pain not reproducible with palpation, and patient assumes pain is cardiac), the MHS is a validated clinical decision rule used to rule out coronary artery disease in primary care patients with chest pain.   Allergies:    Patient thinks symptoms are allergy-related.   Flu exposure:    Denies recent exposure to confirmed flu diagnosis.    Denies receiving a flu vaccine this season.   Patient denies the following red flags:    Changes in alertness or awareness    Decreased urination   Risk factors for antibiotic resistance:    Close contact with a child in    Self-exam:    No difficulty moving their chin toward their chest    Swollen and tender neck lymph nodes   Denies antibiotic treatment for similar symptoms within the past month.   Current medications   Reports taking over-the-counter medication for current symptoms. Patient has taken acetaminophen, diphenhydramine, and ibuprofen.   Reports taking escitalopram 20 MG [Lexapro].   Medication allergies   None.   Medication contraindication review   Denies history of anaphylactic reaction to beta-lactam antibiotics; aspirin triad; blood dyscrasia; bone marrow depression; catecholamine-releasing paraganglioma; coronary artery disease; coagulation disorder; congenital long QT syndrome; depression; electrolyte abnormalities; fungal infection; GI bleeding; GI obstruction; G6PD deficiency; heart arrhythmia; hypertension; kidney disease or hemodialysis; mononucleosis; myasthenia; recent myocardial infarction;  NSAID-induced asthma/urticaria; Parkinson's disease; pheochromocytoma; porphyria; Reye syndrome; seizure disorder; ulcerative colitis; and urinary retention.   Denies history of metoclopramide-associated dystonic reaction and tardive dyskinesia.   No known history of amoxicillin-clavulanate-associated cholestatic jaundice or hepatic impairment.   No known history of azithromycin-associated cholestatic jaundice or hepatic impairment.   Past medical history   Immune conditions: Denies immunocompromising conditions. Denies history of cancer.   Social history   High-risk household contacts: Patient's household includes one or more members of a group with risk factors for influenza complications, including a child < 5 years.   Former smoker.   Assessment   Acute nasopharyngitis (common cold). Ruled out: Traumatic laryngitis.   [facility] required information for COVID-19 lab data reporting (if test is ordered):   Symptoms:    Cough    Fatigue    Rhinitis   Symptom onset: 6-9 days ago ago    Healthcare worker? No  Resident in a congregate care setting? No  Previous history of COVID-19 testing: Patient had a viral test within the last 7 days. Test result was negative.     Plan   Medications:    albuterol sulfate HFA 90 mcg/actuation aerosol inhaler RX 90mcg/puff 2 puffs inhaled q6h PRN 10d for wheezing. If symptoms are severe, may use as often as every 4 hours. Amount is 18 g.    guaifenesin/dextromethorphan 1200 mg/60 mg tablet OTC 1200mg/60mg 1 tab PO bid PRN 10d for cough. Brands to look for include Mucinex DM Maximum Strength. Amount is 20 tab.    oxymetazoline 0.05 % nasal spray OTC 0.05% 2-3 sprays nasal bid PRN 3d for nasal congestion. Do not use more than 6 sprays per nostril in a 24-hour period. Do not use for more than 3 days. Brands to look for include Afrin. Amount is 15 mL.    ibuprofen 200 mg tablet OTC 200mg 2 tabs PO q8h PRN 10d for any fever, pain, or discomfort associated with your condition. Do not exceed  3200mg in a 24-hour period. Amount is 60 tab.    acetaminophen 325 mg tablet OTC 325mg 2 tabs PO q4h PRN 10d for any fever, pain, or discomfort associated with your condition. Do not exceed 6 doses in a 24-hour period. Amount is 120 tab.   The patient's prescription will be sent to:   [id number]   [address] [place] [id number]   Phone: [phone]     Fax: [phone]   Patient informed to purchase OTC medications.   Education:    Condition and causes    Prevention    Treatment and self-care    When to call provider      ----------   Electronically signed by CHAY Warren on 2021-12-26 at 21:40PM   ----------   Patient Interview Transcript:   Why are you getting care through eVisit today? We can't guarantee a specific treatment or test. Your provider will decide what's best for you. Select all that apply.    I want to know if I have a cold or something more serious   Not selected:    I want a specific treatment or medication    I want to know if I need to be seen by a provider    I need a doctor's note    I want to be tested for COVID-19    I want to get the COVID-19 vaccine    I think I'm having side effects from the COVID-19 vaccine    I just want to feel better!    None of the above   Which of these symptoms are bothering you? Select all that apply.    Cough    Runny nose    Fatigue or tiredness   Not selected:    Shortness of breath    Fever    Stuffed-up nose or sinuses    Itchy or watery eyes    Itchy nose or sneezing    Loss of smell or taste    Sore throat    Hoarse voice or loss of voice    Headache    Sweats    Chills    Muscle or body aches    Nausea or vomiting    Diarrhea    I don't have any of these symptoms   Before we learn more about why you're here, we'll get some information related to COVID-19. We'll ask about risk factors, testing, vaccination status, and exposure. Do you have any of these conditions? If so, you may be at increased risk for  complications from COVID-19. Select all that apply.    None of the above   Not selected:    Chronic lung disease, such as cystic fibrosis or interstitial fibrosis    Heart disease, such as congenital heart disease, congestive heart failure, or coronary artery disease    Disorder of the brain, spinal cord, or nerves and muscles, such as dementia, cerebral palsy, epilepsy, muscular dystrophy, or developmental delay    Metabolic disorder or mitochondrial disease    Cerebrovascular disease, such as stroke or another condition affecting the blood vessels or blood supply to the brain   Do you live in a group care setting? Examples include: - Nursing home - Residential care - Psychiatric treatment facility - Group home - [facility] - [facility] and care home - Homeless shelter - Foster care setting Select one.    No   Not selected:    Yes   Have you ever been tested for COVID-19? Select one.    Yes   Not selected:    No   When was your most recent COVID-19 test? Select one.    Within the last week   Not selected:    7 to 14 days ago    15 to 30 days ago    1 to 3 months ago    More than 3 months ago   What type of COVID-19 test did you have? There are 2 types of COVID-19 tests: - Viral tests check if you're currently infected with COVID-19. - Antibody tests check if you've been infected in the past. Select one.    Viral test for current infection   Not selected:    Antibody test for past infection   What was the result of your most recent COVID-19 test? Select one.    Negative (no sign of infection)   Not selected:    Positive (signs of current or past infection)    I'm not sure   Have you gotten the COVID-19 vaccine? Select one.    Yes   Not selected:    No   How many doses of the COVID-19 vaccine have you gotten? This includes boosters. Select one.    2 doses   Not selected:    1 dose    3 doses   Which COVID-19 vaccine did you get for your first dose? Check your Vaccination Record Card under Product Name/. Select  "one.    Kushal & Kushal's Michael Vaccine (J&J/Michael)   Not selected:    Moderna    Pf[employer] (Pfizer)   Which COVID-19 vaccine did you get for your second dose? Check your Vaccination Record Card under Product Name/. Select one.    Moderna   Not selected:    Kushal & Kushal's Michael Vaccine (J&J/[employer])    Pfizer-BioNTech (Pfizer)   When did you get your most recent dose of the COVID-19 vaccine?    More than 14 days ago   Not selected:    Less than 48 hours (2 days) ago    48 to 72 hours (3 days) ago    3 to 5 days ago    5 to 7 days ago    7 to 14 days ago   In the last 14 days, have you traveled outside of your local community? This includes travel by car, RV, bus, train, or plane. Travel increases your chances of getting and spreading COVID-19. Select one.    No   Not selected:    Yes   In the last 14 days, have you had close contact with someone who has coronavirus (COVID-19)? \"Close contact\" means any of these: - Living in the same household as someone with COVID-19. - Caring for someone with COVID-19. - Being within 6 feet of someone with COVID-19 for a total of at least 15 minutes over a 24-hour period. For example, three 5-minute exposures for a total of 15 minutes. - Being in direct contact with respiratory droplets from someone with COVID-19 (being coughed on, kissing, sharing utensils). Select one.    No, not that I know of   Not selected:    Yes, a confirmed case    Yes, a suspected case   Thanks for completing our COVID-19 questions. Now we'll return to your symptoms. When did your symptoms start? If you know the exact date your symptoms started, choose Other and enter the month and day. Select one.    6 to 9 days ago   Not selected:    Less than 48 hours ago    3 to 5 days ago    10 to 14 days ago    2 to 4 weeks ago    More than a month ago    Other (specify)   Did your symptoms come on suddenly or gradually? Select one.    Gradually   Not selected:    Suddenly    I'm not " "sure   Have your symptoms improved at all since they began? Select one.    No   Not selected:    Yes, but they haven't gone away completely    Yes, but then they came back worse than before    I'm not sure   Do you cough so hard that it's made you gag or vomit? By gag, we mean has your coughing made you choke or dry heave? Select all that apply.    No   Not selected:    Yes, my coughing has made me gag    Yes, my coughing has made me vomit   When is your cough the worst? Select all that apply.    In the morning, or when I wake up    During the day    At nighttime, or while I'm sleeping   Not selected:    I'm not sure   Are you coughing up mucus or phlegm? Select one.    No, my cough is dry   Not selected:    Yes, a little    Yes, a lot   What color is your nasal drainage? Select one.    Yellow   Not selected:    Clear    White    Green    My nose is stuffed but not draining or running    I'm not sure   Is your nasal drainage thick or thin? Select one.    Thick   Not selected:    Thin    I'm not sure   Is there any drainage (mucus) going down the back of your throat? This kind of drainage is also called \"postnasal drip.\" Select one.    Yes   Not selected:    No    I'm not sure   Since your symptoms started, have you felt dizzy? Select one.    No   Not selected:    Yes, but I can continue with my regular daily activities    Yes, and it makes it hard to stand, walk, or do daily activities   Do you have chest pain? You might also feel it as discomfort, aching, tightness, or squeezing in the chest. Select one.    Yes   Not selected:    No   Which of these is true of your chest pain? Select one.    My chest hurts only when I cough   Not selected:    My chest hurts even when I'm not coughing   Have you urinated at least 3 times in the last 24 hours? Select one.    Yes   Not selected:    No    I'm not sure   Changes in alertness or awareness may mean you need emergency care. Since your symptoms started, have you had any of " these? Select all that apply.    None of the above   Not selected:    Confusion    Slurred speech    Not knowing where you are or what day it is    Difficulty staying conscious    Fainting or passing out   Do your symptoms include a whistling sound, or wheezing, when you breathe? Select one.    Yes   Not selected:    No    I'm not sure   Do you have any of these symptoms in your ear(s)? Select all that apply.    Pressure    Crackling or popping    Plugged or blocked sensation   Not selected:    Pain    Fullness    None of the above   Can you move your chin toward your chest?    Yes   Not selected:    No, my neck is too stiff   Are your glands/lymph nodes swollen, or does it hurt when you touch them?    Yes   Not selected:    No    I'm not sure   People with a very high body mass index (BMI) are at higher risk for developing complications from the flu and severe illness from COVID-19. To determine your BMI, we need to know your weight and height. Please enter your weight (in pounds).    Weight   Please enter your height.    Height   In the past week, has anyone around you (such as at school, work, or home) had a confirmed diagnosis of the flu? A confirmed diagnosis means that a nose swab was done to verify a flu infection. Select all that apply.    No   Not selected:    I live with someone who has the flu    I've been within touching distance of someone who has the flu    I've walked by, or sat about 3 feet away from, someone who has the flu    I've been in the same building as someone who has the flu    I'm not sure   Have you ever been diagnosed with asthma? Select one.    No   Not selected:    Yes   Have you ever been prescribed albuterol to use for wheezing, cough, or shortness of breath caused by a cold, bronchitis, or pneumonia? Albuterol (ProAir, Proventil, Ventolin) is prescribed as an inhaler or a solution to be used with a nebulizer machine. Select one.    Yes   Not selected:    No    I'm not sure   Have  you ever been prescribed a steroid inhaler to use for wheezing, cough, or shortness of breath caused by a cold, bronchitis, or pneumonia? Some examples of steroid inhalers include Pulmicort, Flovent, Qvar, and Alvesco. Select one.    Yes   Not selected:    No    I'm not sure   Have you used albuterol for your current symptoms? This includes both albuterol inhalers and albuterol solution in a nebulizer machine. Select one.    No, I don't have any, or it's    Not selected:    Yes    No, I don't feel like I need it   Would you like a prescription for albuterol? Select one.    No   Not selected:    Yes   Have you ever been diagnosed with chronic obstructive pulmonary disease (COPD)? Select one.    No   Not selected:    Yes    I'm not sure   Do you have allergies (pollen, dust mites, mold, animal dander)? Select one.    I'm not sure   Not selected:    Yes    No   Do you think your symptoms could be allergy-related? Select one.    Yes   Not selected:    No    I'm not sure   Have you had a flu shot this season? Select one.    No   Not selected:    Yes, less than 2 weeks ago    Yes, 2 to 4 weeks ago    Yes, 1 to 3 months ago    Yes, 3 to 6 months ago    Yes, more than 6 months ago    I'm not sure   The flu and COVID-19 can be more serious for people with certain conditions or characteristics. These questions help us figure out if you or anyone you live with is at higher risk for complications from these infections. Do either of these statements apply to you? Select all that apply.    None of the above   Not selected:    I'm  or Native [place]    I'm a healthcare worker   Do you smoke tobacco? Select one.    No, I quit   Not selected:    Yes, every day    Yes, some days    No, never   Some conditions can put you at risk for more serious infections. Do any of these apply to you? Select all that apply.    I'm in close contact with a child in    Not selected:    I've been hospitalized within the  last 5 days    I have diabetes    None of the above   Are you currently being treated for any of these conditions? Scroll to see all options. Select all that apply.    None of the above   Not selected:    Aspirin triad (also known as Samter's triad or ASA triad)    Asthma or hives from taking aspirin or other NSAIDs, such as ibuprofen or naproxen    Blockage or narrowing of the blood vessels of the heart    Blood dyscrasia, such anemia, leukemia, lymphoma, or myeloma    Bone marrow depression    Catecholamine-releasing paraganglioma    Blood clotting disorder    Congenital long QT syndrome    Depression    Difficulty urinating or completely emptying your bladder    Uncorrected electrolyte abnormalities    Fungal infection    Gastrointestinal (GI) bleeding    Gastrointestinal (GI) obstruction    G6PD deficiency    Recent heart attack    High blood pressure    Irregular heartbeat or heart rhythm    Kidney disease or hemodialysis    Mononucleosis (mono)    Myasthenia gravis    Parkinson's disease    Pheochromocytoma    Reye syndrome    Seizure disorder    Ulcerative colitis   Do you have any of these conditions that can affect the immune system? Scroll to see all options. Select all that apply.    None of these   Not selected:    History of bone marrow transplant    Chronic kidney disease    Chronic liver disease (including cirrhosis)    HIV/AIDS    Inflammatory bowel disease (Crohn's disease or ulcerative colitis)    Lupus    Moderate to severe plaque psoriasis    Multiple sclerosis    Rheumatoid arthritis    Sickle cell anemia    Alpha or beta thalassemia    History of solid organ transplant (kidney, liver, or heart)    History of spleen removal    An autoimmune disorder not listed here    A condition requiring treatment with long-term use of oral steroids (such as prednisone, prednisolone, or dexamethasone)   Have you ever been diagnosed with cancer? Select one.    No   Not selected:    Yes, I have cancer now     Yes, but I'm in remission   Have you ever had either of these conditions? Select all that apply.    No   Not selected:    Metoclopramide-associated dystonic reaction    Tardive dyskinesia   Do any of these apply to the people who live with you? Select all that apply.    A child under the age of 5   Not selected:    An adult 65 or older    A person who is pregnant    A person who has given birth, had a miscarriage, had a pregnancy loss, or had an  in the last 2 weeks    An  or Native [place]    None of the above   Does any member of your household have any of these medical conditions? Select all that apply.    None of the above   Not selected:    Asthma    Disorders of the brain, spinal cord, or nerves and muscles, such as dementia, cerebral palsy, epilepsy, muscular dystrophy, or developmental delay    Chronic lung disease, such as COPD or cystic fibrosis    Heart disease, such as congenital heart disease, congestive heart failure, or coronary artery disease    Cerebrovascular disease, such as stroke or another condition affecting the blood vessels or blood supply to the brain    Blood disorders, such as sickle cell disease    Diabetes    Metabolic disorders such as inherited metabolic disorders or mitochondrial disease    Kidney disorders    Liver disorders    Weakened immune system due to illness or medications such as chemotherapy or steroids    Children under the age of 19 who are on long-term aspirin therapy    Extreme obesity (BMI > 40)   Just a few more questions about medications, and then you're finished. Have you used any non-prescription medications or nasal sprays for your current symptoms? Examples include saline sprays, decongestants, NyQuil, and Tylenol. Select one.    Yes   Not selected:    No   Which of these non-prescription medications have you tried? Scroll to see all options. Select all that apply.    Acetaminophen (Tylenol)    Diphenhydramine (Benadryl)    Ibuprofen  (Advil, Motrin, Midol)   Not selected:    Budesonide (Rhinocort)    Cetirizine (Zyrtec)    Chlorpheniramine (Aller-chlor, Chlor-Trimeton)    Cromolyn (NasalCrom)    Dextromethorphan (Delsym, Robitussin, Vicks DayQuil Cough)    Fexofenadine (Allegra)    Fluticasone (Flonase)    Guaifenesin (Mucinex)    Guaifenesin/dextromethorphan (Delsym DM, Mucinex DM, Robitussin DM)    Ketotifen (Alaway, Zaditor)    Loratadine (Alavert, Claritin)    Naphazoline-pheniramine (Naphcon-A, Opcon-A, Visine-A)    Omeprazole (Prilosec)    Oxymetazoline (Afrin)    Phenylephrine (Sudafed)    Triamcinolone (Nasacort)    None of the above   In the past month, have you taken antibiotics for similar symptoms? Examples of antibiotics include amoxicillin, amoxicillin-clavulanate (Augmentin), penicillin, cefdinir (Omnicef), doxycycline, and clindamycin (Cleocin). Select one.    No   Not selected:    Yes    I'm not sure   Have you taken any monoamine oxidase inhibitor (MAOI) medications in the last 14 days? Examples include rasagiline (Azilect), selegiline (Eldepryl, Zelapar), isocarboxazid (Marplan), phenelzine (Nardil), and tranylcypromine (Parnate). Select one.    No   Not selected:    Yes    I'm not sure   Do you take Kynmobi or Apokyn (apomorphine)? Select one.    No   Not selected:    Yes    I'm not sure   Are you taking any other medications or supplements? On the next screen, you need to list all vitamins, supplements, non-prescription medications (such as aspirin or Aleve), and prescription medications that you're taking. Select one.    Yes   Not selected:    Yes, but I'm not sure what they are    No   Have you ever had an allergic or bad reaction to any medication? Select one.    No   Not selected:    Yes   Are you allergic to milk or to the proteins found in milk (for example, whey or casein)? A milk allergy is different from lactose intolerance. Select one.    No   Not selected:    Yes    I'm not sure   Have you ever had jaundice or  liver problems as a result of taking amoxicillin-clavulanate (Augmentin)? Jaundice is a condition in which the skin and the whites of the eyes turn yellow. Select all that apply.    No, not that I know of   Not selected:    Yes, jaundice    Yes, liver problems   Have you ever had jaundice or liver problems as a result of taking azithromycin (Zithromax, Zmax)? Jaundice is a condition in which the skin and the whites of the eyes turn yellow. Select all that apply.    No, not that I know of   Not selected:    Yes, jaundice    Yes, liver problems   Do you need a doctor's note? A doctor's note confirms that you received care today and states when you can return to school or work. It does not contain information about your diagnosis or treatment plan. Your provider will make the final decision on whether to give you a doctor's note and for how long. Doctor's notes CANNOT be backdated. We can't provide medical leave paperwork through this type of visit. If more paperwork is needed to request time off, contact your primary care provider. Select one.    No   Not selected:    Today only (1 day)    Today and tomorrow (2 days)    3 days    7 days    10 days    14 days   Is there anything else you'd like to tell us about your symptoms?    coughing throughout the day and night. Runny nose. Feeling tired and achey. Had a Covid test 5 days after symptoms started, was negative. That was last week. Still feeling lousy.   ----------   Medical history   Medical history data does not currently exist for this patient.

## 2022-04-04 RX ORDER — ESCITALOPRAM OXALATE 20 MG/1
20 TABLET ORAL DAILY
Qty: 90 TABLET | Refills: 0 | Status: SHIPPED | OUTPATIENT
Start: 2022-04-04 | End: 2022-06-30

## 2022-06-20 ENCOUNTER — OFFICE VISIT (OUTPATIENT)
Dept: FAMILY MEDICINE CLINIC | Facility: CLINIC | Age: 42
End: 2022-06-20

## 2022-06-20 VITALS
HEART RATE: 97 BPM | RESPIRATION RATE: 16 BRPM | DIASTOLIC BLOOD PRESSURE: 102 MMHG | TEMPERATURE: 98.6 F | SYSTOLIC BLOOD PRESSURE: 150 MMHG | WEIGHT: 216 LBS | HEIGHT: 73 IN | BODY MASS INDEX: 28.63 KG/M2 | OXYGEN SATURATION: 97 %

## 2022-06-20 DIAGNOSIS — M54.42 ACUTE LEFT-SIDED LOW BACK PAIN WITH LEFT-SIDED SCIATICA: Primary | ICD-10-CM

## 2022-06-20 PROCEDURE — 99213 OFFICE O/P EST LOW 20 MIN: CPT | Performed by: FAMILY MEDICINE

## 2022-06-20 RX ORDER — CYCLOBENZAPRINE HCL 10 MG
10 TABLET ORAL NIGHTLY PRN
Qty: 20 TABLET | Refills: 0 | Status: SHIPPED | OUTPATIENT
Start: 2022-06-20 | End: 2022-08-05

## 2022-06-20 RX ORDER — METHYLPREDNISOLONE 4 MG/1
TABLET ORAL
Qty: 1 EACH | Refills: 0 | Status: SHIPPED | OUTPATIENT
Start: 2022-06-20 | End: 2022-08-05

## 2022-06-20 RX ORDER — HYDROCODONE BITARTRATE AND ACETAMINOPHEN 5; 325 MG/1; MG/1
1 TABLET ORAL EVERY 8 HOURS PRN
Qty: 12 TABLET | Refills: 0 | Status: SHIPPED | OUTPATIENT
Start: 2022-06-20 | End: 2022-08-05

## 2022-06-20 NOTE — PROGRESS NOTES
Subjective   Chief Complaint   Patient presents with   • Back Pain     Low radiates to left side/ down buttocks and left leg, 3 days     Antonio Evans is a 41 y.o. male.     Patient Care Team:  Kristine Pfeiffer MD as PCP - General (Family Medicine)    He is coming in today due to lower back pain.  He reports that about 2 weeks ago he was not [place] and did more walking.  He later on started having some back discomfort, however over the last 3 days it has build up significantly and he is pretty much constantly in pain, which affects his daily functioning and also his sleep at night.  The pain is located in the left lower back area and radiates down his butt cheek and on the side of the thigh and ends at about the knee level.  He denies any numbness or tingling.  He has to somehow position himself towards the left to be able to walk.  He had the brace at home and started using that.  He has tried some over-the-counter medications with minimal relief.  He has had some on and off back issues over the last 10 years and even previously saw chiropractor a while back.       The following portions of the patient's history were reviewed and updated as appropriate: allergies, current medications, past family history, past medical history, past social history, past surgical history and problem list.  Past Medical History:   Diagnosis Date   • Allergic rhinitis    • Anxiety    • Hemorrhoids      Past Surgical History:   Procedure Laterality Date   • HEMORRHOIDECTOMY     • RECTAL EXAMINATION UNDER ANESTHESIA N/A 8/6/2020    Procedure: RECTAL EXAM UNDER ANESTHESIA  AND SETON PLACEMENT;  Surgeon: Martin Lovett MD;  Location: [facility] MAIN OR;  Service: General;  Laterality: N/A;   • TEMPOROMANDIBULAR JOINT ARTHROPLASTY  1997   • TONSILLECTOMY     • VARICOCELE EXCISION  1997     The patient has a family history of  Family History   Problem Relation Age of Onset   • Mitral valve prolapse Mother      Social History  "    Socioeconomic History   • Marital status:    Tobacco Use   • Smoking status: Never Smoker   • Smokeless tobacco: Never Used   Substance and Sexual Activity   • Alcohol use: Yes     Alcohol/week: 8.0 standard drinks     Types: 8 Cans of beer per week   • Drug use: No   • Sexual activity: Yes       Review of Systems   Musculoskeletal: Positive for arthralgias and back pain. Negative for gait problem, joint swelling and neck pain.   Neurological: Negative for weakness and numbness.     Visit Vitals  BP (!) 150/102 (BP Location: Left arm, Patient Position: Sitting, Cuff Size: Adult)   Pulse 97   Temp 98.6 °F (37 °C)   Resp 16   Ht 185.4 cm (72.99\")   Wt 98 kg (216 lb)   SpO2 97%   BMI 28.50 kg/m²       Current Outpatient Medications:   •  cyclobenzaprine (FLEXERIL) 10 MG tablet, Take 1 tablet by mouth At Night As Needed for Muscle Spasms., Disp: 20 tablet, Rfl: 0  •  escitalopram (LEXAPRO) 20 MG tablet, TAKE 1 TABLET BY MOUTH DAILY, Disp: 90 tablet, Rfl: 0  •  HYDROcodone-acetaminophen (Norco) 5-325 MG per tablet, Take 1 tablet by mouth Every 8 (Eight) Hours As Needed for Moderate Pain ., Disp: 12 tablet, Rfl: 0  •  methylPREDNISolone (Medrol) 4 MG dose pack, Take as directed on package instructions., Disp: 1 each, Rfl: 0    Objective   Physical Exam  Constitutional:       General: He is not in acute distress.     Appearance: Normal appearance. He is well-developed. He is not ill-appearing or diaphoretic.      Comments: Patient is in no distress, patient has normal voice and speech.  Normal respiratory effort.   HENT:      Head: Normocephalic and atraumatic.   Pulmonary:      Effort: Pulmonary effort is normal.   Musculoskeletal:      Cervical back: Normal range of motion and neck supple.      Comments: There is some palpation tenderness over the muscles in the left lumbar spine area.  Positive straight leg raising test on the left at about 75 degrees while sitting and lying down.  Normal reflexes.  No " weakness.   Neurological:      General: No focal deficit present.      Mental Status: He is alert and oriented to person, place, and time. Mental status is at baseline.   Psychiatric:         Mood and Affect: Mood normal.           Assessment & Plan   Diagnoses and all orders for this visit:    1. Acute left-sided low back pain with left-sided sciatica (Primary)  -     methylPREDNISolone (Medrol) 4 MG dose pack; Take as directed on package instructions.  Dispense: 1 each; Refill: 0  -     cyclobenzaprine (FLEXERIL) 10 MG tablet; Take 1 tablet by mouth At Night As Needed for Muscle Spasms.  Dispense: 20 tablet; Refill: 0  -     XR Spine Lumbar 2 or 3 View; Future  -     HYDROcodone-acetaminophen (Norco) 5-325 MG per tablet; Take 1 tablet by mouth Every 8 (Eight) Hours As Needed for Moderate Pain .  Dispense: 12 tablet; Refill: 0      I will be starting him on steroid pack and I also gave him prescription for some muscle relaxer.  Prescription for some pain pills was also given to take as needed until steroid kicks in and hopefully helps with the pain.  I will be getting x-ray of the lumbar spine as well.  Further recommendations will depend on response to the medication and x-ray results.      Return if symptoms worsen or fail to improve, for Recheck.    Requested Prescriptions     Signed Prescriptions Disp Refills   • methylPREDNISolone (Medrol) 4 MG dose pack 1 each 0     Sig: Take as directed on package instructions.   • cyclobenzaprine (FLEXERIL) 10 MG tablet 20 tablet 0     Sig: Take 1 tablet by mouth At Night As Needed for Muscle Spasms.   • HYDROcodone-acetaminophen (Norco) 5-325 MG per tablet 12 tablet 0     Sig: Take 1 tablet by mouth Every 8 (Eight) Hours As Needed for Moderate Pain .

## 2022-06-30 RX ORDER — ESCITALOPRAM OXALATE 20 MG/1
20 TABLET ORAL DAILY
Qty: 90 TABLET | Refills: 0 | Status: SHIPPED | OUTPATIENT
Start: 2022-06-30 | End: 2022-08-05 | Stop reason: SDUPTHER

## 2022-08-02 ENCOUNTER — LAB (OUTPATIENT)
Dept: FAMILY MEDICINE CLINIC | Facility: CLINIC | Age: 42
End: 2022-08-02

## 2022-08-02 ENCOUNTER — TELEPHONE (OUTPATIENT)
Dept: FAMILY MEDICINE CLINIC | Facility: CLINIC | Age: 42
End: 2022-08-02

## 2022-08-02 DIAGNOSIS — Z00.00 PREVENTATIVE HEALTH CARE: ICD-10-CM

## 2022-08-02 DIAGNOSIS — Z12.5 PROSTATE CANCER SCREENING: Primary | ICD-10-CM

## 2022-08-02 LAB
25(OH)D3 SERPL-MCNC: 20.7 NG/ML (ref 30–100)
ALBUMIN SERPL-MCNC: 4.6 G/DL (ref 3.5–5.2)
ALBUMIN/GLOB SERPL: 2.2 G/DL
ALP SERPL-CCNC: 71 U/L (ref 39–117)
ALT SERPL W P-5'-P-CCNC: 10 U/L (ref 1–41)
ANION GAP SERPL CALCULATED.3IONS-SCNC: 9 MMOL/L (ref 5–15)
AST SERPL-CCNC: 33 U/L (ref 1–40)
BACTERIA UR QL AUTO: NORMAL /HPF
BASOPHILS # BLD AUTO: 0.04 10*3/MM3 (ref 0–0.2)
BASOPHILS NFR BLD AUTO: 0.7 % (ref 0–1.5)
BILIRUB SERPL-MCNC: 0.6 MG/DL (ref 0–1.2)
BILIRUB UR QL STRIP: NEGATIVE
BUN SERPL-MCNC: 13 MG/DL (ref 6–20)
BUN/CREAT SERPL: 15.9 (ref 7–25)
CALCIUM SPEC-SCNC: 8.8 MG/DL (ref 8.6–10.5)
CHLORIDE SERPL-SCNC: 109 MMOL/L (ref 98–107)
CHOLEST SERPL-MCNC: 129 MG/DL (ref 0–200)
CLARITY UR: CLEAR
CO2 SERPL-SCNC: 24 MMOL/L (ref 22–29)
COLOR UR: YELLOW
CREAT SERPL-MCNC: 0.82 MG/DL (ref 0.76–1.27)
DEPRECATED RDW RBC AUTO: 42.2 FL (ref 37–54)
EGFRCR SERPLBLD CKD-EPI 2021: 111.8 ML/MIN/1.73
EOSINOPHIL # BLD AUTO: 0.14 10*3/MM3 (ref 0–0.4)
EOSINOPHIL NFR BLD AUTO: 2.4 % (ref 0.3–6.2)
ERYTHROCYTE [DISTWIDTH] IN BLOOD BY AUTOMATED COUNT: 12.8 % (ref 12.3–15.4)
GLOBULIN UR ELPH-MCNC: 2.1 GM/DL
GLUCOSE SERPL-MCNC: 84 MG/DL (ref 65–99)
GLUCOSE UR STRIP-MCNC: NEGATIVE MG/DL
HCT VFR BLD AUTO: 42.2 % (ref 37.5–51)
HDLC SERPL-MCNC: 42 MG/DL (ref 40–60)
HGB BLD-MCNC: 14.3 G/DL (ref 13–17.7)
HGB UR QL STRIP.AUTO: ABNORMAL
HYALINE CASTS UR QL AUTO: NORMAL /LPF
IMM GRANULOCYTES # BLD AUTO: 0.01 10*3/MM3 (ref 0–0.05)
IMM GRANULOCYTES NFR BLD AUTO: 0.2 % (ref 0–0.5)
KETONES UR QL STRIP: NEGATIVE
LDLC SERPL CALC-MCNC: 76 MG/DL (ref 0–100)
LDLC/HDLC SERPL: 1.83 {RATIO}
LEUKOCYTE ESTERASE UR QL STRIP.AUTO: NEGATIVE
LYMPHOCYTES # BLD AUTO: 1.87 10*3/MM3 (ref 0.7–3.1)
LYMPHOCYTES NFR BLD AUTO: 32 % (ref 19.6–45.3)
MCH RBC QN AUTO: 30.6 PG (ref 26.6–33)
MCHC RBC AUTO-ENTMCNC: 33.9 G/DL (ref 31.5–35.7)
MCV RBC AUTO: 90.4 FL (ref 79–97)
MONOCYTES # BLD AUTO: 0.4 10*3/MM3 (ref 0.1–0.9)
MONOCYTES NFR BLD AUTO: 6.8 % (ref 5–12)
NEUTROPHILS NFR BLD AUTO: 3.39 10*3/MM3 (ref 1.7–7)
NEUTROPHILS NFR BLD AUTO: 57.9 % (ref 42.7–76)
NITRITE UR QL STRIP: NEGATIVE
NRBC BLD AUTO-RTO: 0 /100 WBC (ref 0–0.2)
PH UR STRIP.AUTO: 6.5 [PH] (ref 5–8)
PLATELET # BLD AUTO: 230 10*3/MM3 (ref 140–450)
PMV BLD AUTO: 8.7 FL (ref 6–12)
POTASSIUM SERPL-SCNC: 4.4 MMOL/L (ref 3.5–5.2)
PROT SERPL-MCNC: 6.7 G/DL (ref 6–8.5)
PROT UR QL STRIP: ABNORMAL
PSA SERPL-MCNC: 0.25 NG/ML (ref 0–4)
RBC # BLD AUTO: 4.67 10*6/MM3 (ref 4.14–5.8)
RBC # UR STRIP: NORMAL /HPF
REF LAB TEST METHOD: NORMAL
SODIUM SERPL-SCNC: 142 MMOL/L (ref 136–145)
SP GR UR STRIP: 1.02 (ref 1–1.03)
SQUAMOUS #/AREA URNS HPF: NORMAL /HPF
TRIGL SERPL-MCNC: 51 MG/DL (ref 0–150)
TSH SERPL DL<=0.05 MIU/L-ACNC: 1.26 UIU/ML (ref 0.27–4.2)
UROBILINOGEN UR QL STRIP: ABNORMAL
VLDLC SERPL-MCNC: 11 MG/DL (ref 5–40)
WBC # UR STRIP: NORMAL /HPF
WBC NRBC COR # BLD: 5.85 10*3/MM3 (ref 3.4–10.8)

## 2022-08-02 PROCEDURE — 82306 VITAMIN D 25 HYDROXY: CPT | Performed by: FAMILY MEDICINE

## 2022-08-02 PROCEDURE — 80053 COMPREHEN METABOLIC PANEL: CPT | Performed by: FAMILY MEDICINE

## 2022-08-02 PROCEDURE — 36415 COLL VENOUS BLD VENIPUNCTURE: CPT | Performed by: FAMILY MEDICINE

## 2022-08-02 PROCEDURE — 84443 ASSAY THYROID STIM HORMONE: CPT | Performed by: FAMILY MEDICINE

## 2022-08-02 PROCEDURE — 80061 LIPID PANEL: CPT | Performed by: FAMILY MEDICINE

## 2022-08-02 PROCEDURE — 85025 COMPLETE CBC W/AUTO DIFF WBC: CPT | Performed by: FAMILY MEDICINE

## 2022-08-02 PROCEDURE — G0103 PSA SCREENING: HCPCS | Performed by: FAMILY MEDICINE

## 2022-08-02 PROCEDURE — 81001 URINALYSIS AUTO W/SCOPE: CPT | Performed by: FAMILY MEDICINE

## 2022-08-05 ENCOUNTER — OFFICE VISIT (OUTPATIENT)
Dept: FAMILY MEDICINE CLINIC | Facility: CLINIC | Age: 42
End: 2022-08-05

## 2022-08-05 VITALS
BODY MASS INDEX: 28.04 KG/M2 | RESPIRATION RATE: 16 BRPM | HEART RATE: 69 BPM | OXYGEN SATURATION: 97 % | HEIGHT: 73 IN | WEIGHT: 211.6 LBS | TEMPERATURE: 98.4 F | DIASTOLIC BLOOD PRESSURE: 85 MMHG | SYSTOLIC BLOOD PRESSURE: 121 MMHG

## 2022-08-05 DIAGNOSIS — Z00.00 PREVENTATIVE HEALTH CARE: Primary | ICD-10-CM

## 2022-08-05 PROCEDURE — 99396 PREV VISIT EST AGE 40-64: CPT | Performed by: FAMILY MEDICINE

## 2022-08-05 RX ORDER — ERGOCALCIFEROL 1.25 MG/1
50000 CAPSULE ORAL WEEKLY
Qty: 12 CAPSULE | Refills: 1 | Status: SHIPPED | OUTPATIENT
Start: 2022-08-05

## 2022-08-05 RX ORDER — ESCITALOPRAM OXALATE 20 MG/1
20 TABLET ORAL DAILY
Qty: 90 TABLET | Refills: 2 | Status: SHIPPED | OUTPATIENT
Start: 2022-08-05

## 2022-08-05 NOTE — PROGRESS NOTES
Subjective   Chief Complaint   Patient presents with   • Annual Exam     Antonio Evans is a 42 y.o. male.     Patient Care Team:  Kristine Pfeiffer MD as PCP - General (Family Medicine)    He is coming in today for his annual wellness exam.  He also recently had fasting blood work done and he would like to review these results.  He reports to be doing overall well and he denies any issues or concerns.  He previously had some back issues and he was seen here few months ago, he tells me that the remedy given back then resolved his pain and he feels good. Patient does not report any chest pain, shortness of breath, dizziness, nausea, vomiting, or diarrhea, visual issues, headaches, numbness or tingling. No urinary issues reported like urgency, frequency, or discomfort upon urination.  No significant weight changes reported.  No swelling reported.  No rashes or any other skin issues reported. No emotional issues or insomnia.       The following portions of the patient's history were reviewed and updated as appropriate: allergies, current medications, past family history, past medical history, past social history, past surgical history and problem list.  Past Medical History:   Diagnosis Date   • Allergic rhinitis    • Anxiety    • Hemorrhoids      Past Surgical History:   Procedure Laterality Date   • HEMORRHOIDECTOMY     • RECTAL EXAMINATION UNDER ANESTHESIA N/A 8/6/2020    Procedure: RECTAL EXAM UNDER ANESTHESIA  AND SETON PLACEMENT;  Surgeon: Martin Lovett MD;  Location: [facility];  Service: General;  Laterality: N/A;   • TEMPOROMANDIBULAR JOINT ARTHROPLASTY  1997   • TONSILLECTOMY     • VARICOCELE EXCISION  1997     The patient has a family history of  Family History   Problem Relation Age of Onset   • Mitral valve prolapse Mother      Social History     Socioeconomic History   • Marital status:    Tobacco Use   • Smoking status: Never Smoker   • Smokeless tobacco: Never Used   Substance  "and Sexual Activity   • Alcohol use: Yes     Alcohol/week: 8.0 standard drinks     Types: 8 Cans of beer per week   • Drug use: No   • Sexual activity: Yes       Review of Systems   Constitutional: Negative for activity change, appetite change, chills, fatigue, fever, unexpected weight gain and unexpected weight loss.   HENT: Negative for congestion, ear pain, hearing loss, nosebleeds, postnasal drip, swollen glands, tinnitus and trouble swallowing.    Eyes: Negative for blurred vision, double vision and visual disturbance.   Respiratory: Negative for cough, choking, chest tightness, shortness of breath, wheezing and stridor.    Cardiovascular: Negative for chest pain, palpitations and leg swelling.   Gastrointestinal: Negative for abdominal distention, abdominal pain, anal bleeding, constipation, diarrhea, nausea, vomiting and GERD.   Endocrine: Negative for cold intolerance, heat intolerance and polyphagia.   Genitourinary: Negative for dysuria, flank pain, frequency, hematuria and urgency.   Musculoskeletal: Negative for arthralgias, back pain, gait problem, joint swelling and neck pain.   Skin: Negative for color change, dry skin and skin lesions.   Allergic/Immunologic: Negative for environmental allergies and food allergies.   Neurological: Negative for dizziness, tremors, speech difficulty, light-headedness, numbness, headache and confusion.   Hematological: Negative for adenopathy. Does not bruise/bleed easily.   Psychiatric/Behavioral: Negative for decreased concentration, sleep disturbance, depressed mood and stress.     Visit Vitals  /85 (BP Location: Left arm, Patient Position: Sitting, Cuff Size: Adult)   Pulse 69   Temp 98.4 °F (36.9 °C)   Resp 16   Ht 185.4 cm (73\")   Wt 96 kg (211 lb 9.6 oz)   SpO2 97%   BMI 27.92 kg/m²       Current Outpatient Medications:   •  escitalopram (LEXAPRO) 20 MG tablet, Take 1 tablet by mouth Daily., Disp: 90 tablet, Rfl: 2  •  vitamin D (ERGOCALCIFEROL) 1.25 MG " (68017 UT) capsule capsule, Take 1 capsule by mouth 1 (One) Time Per Week., Disp: 12 capsule, Rfl: 1    Objective   Physical Exam  Vitals and nursing note reviewed.   Constitutional:       General: He is not in acute distress.     Appearance: He is well-developed. He is not diaphoretic.   HENT:      Head: Normocephalic and atraumatic.      Right Ear: External ear normal.      Left Ear: External ear normal.   Eyes:      General: No scleral icterus.        Right eye: No discharge.         Left eye: No discharge.      Conjunctiva/sclera: Conjunctivae normal.      Pupils: Pupils are equal, round, and reactive to light.   Neck:      Thyroid: No thyromegaly.      Vascular: No JVD.   Cardiovascular:      Rate and Rhythm: Normal rate and regular rhythm.      Heart sounds: Normal heart sounds. No murmur heard.    No friction rub. No gallop.   Pulmonary:      Effort: Pulmonary effort is normal. No respiratory distress.      Breath sounds: Normal breath sounds. No stridor. No wheezing, rhonchi or rales.   Abdominal:      General: Bowel sounds are normal. There is no distension.      Palpations: Abdomen is soft. There is no mass.      Tenderness: There is no abdominal tenderness. There is no guarding or rebound.      Hernia: No hernia is present.   Musculoskeletal:         General: No swelling, tenderness or deformity. Normal range of motion.      Cervical back: Normal range of motion and neck supple. No muscular tenderness.      Right lower leg: No edema.      Left lower leg: No edema.   Lymphadenopathy:      Cervical: No cervical adenopathy.   Skin:     General: Skin is warm and dry.      Capillary Refill: Capillary refill takes less than 2 seconds.      Coloration: Skin is not pale.      Findings: No bruising, erythema, lesion or rash.   Neurological:      General: No focal deficit present.      Mental Status: He is alert and oriented to person, place, and time.      Cranial Nerves: No cranial nerve deficit.      Sensory: No  sensory deficit.      Motor: No weakness.      Coordination: Coordination normal.      Deep Tendon Reflexes: Reflexes normal.   Psychiatric:         Mood and Affect: Mood normal.         Behavior: Behavior normal.         Judgment: Judgment normal.           Assessment & Plan   Diagnoses and all orders for this visit:    1. Preventative health care (Primary)    Other orders  -     vitamin D (ERGOCALCIFEROL) 1.25 MG (83183 UT) capsule capsule; Take 1 capsule by mouth 1 (One) Time Per Week.  Dispense: 12 capsule; Refill: 1  -     escitalopram (LEXAPRO) 20 MG tablet; Take 1 tablet by mouth Daily.  Dispense: 90 tablet; Refill: 2      Wellness exam was done today - see above for details. Healthy life style was reviewed and discussed and re-enforced. Regular exercise and healthy diet were also discussed and recommended.  I reviewed and discussed with the patient his recent fasting blood work results.  He is vaccinated and pleased against COVID-19.  He may continue Lexapro for his anxiety.  I will be starting him on a high-dose of vitamin D due to low level noted on his recent blood work.        Return in about 1 year (around 8/5/2023) for Annual physical.    Requested Prescriptions     Signed Prescriptions Disp Refills   • vitamin D (ERGOCALCIFEROL) 1.25 MG (15869 UT) capsule capsule 12 capsule 1     Sig: Take 1 capsule by mouth 1 (One) Time Per Week.   • escitalopram (LEXAPRO) 20 MG tablet 90 tablet 2     Sig: Take 1 tablet by mouth Daily.

## 2023-06-04 RX ORDER — ESCITALOPRAM OXALATE 20 MG/1
20 TABLET ORAL DAILY
Qty: 90 TABLET | Refills: 0 | Status: SHIPPED | OUTPATIENT
Start: 2023-06-04

## 2023-07-26 ENCOUNTER — TELEPHONE (OUTPATIENT)
Dept: FAMILY MEDICINE CLINIC | Facility: CLINIC | Age: 43
End: 2023-07-26

## 2023-07-26 DIAGNOSIS — Z00.00 PREVENTATIVE HEALTH CARE: Primary | ICD-10-CM

## 2023-07-26 DIAGNOSIS — E55.9 VITAMIN D DEFICIENCY: ICD-10-CM

## 2023-07-26 DIAGNOSIS — Z12.5 PROSTATE CANCER SCREENING: ICD-10-CM

## 2023-07-26 NOTE — TELEPHONE ENCOUNTER
"    Caller: Antonio Evans \"Srikanth\"    Relationship: Self    Best call back number: [phone]    What orders are you requesting (i.e. lab or imaging): LABS FOR PHYSICAL    In what timeframe would the patient need to come in: EITHER 8/3/23 OR 8/4/23    Where will you receive your lab/imaging services:     Additional notes: WILL NEED MORNING LAB APPT ,FOR RESULTS TO BE IN BY APPT TIME     WILL NEED TO BE CALLED FOR APPT  FOR LABS        "

## 2023-08-03 ENCOUNTER — LAB (OUTPATIENT)
Dept: FAMILY MEDICINE CLINIC | Facility: CLINIC | Age: 43
End: 2023-08-03
Payer: COMMERCIAL

## 2023-08-03 LAB
25(OH)D3 SERPL-MCNC: 25.2 NG/ML (ref 30–100)
ALBUMIN SERPL-MCNC: 5 G/DL (ref 3.5–5.2)
ALBUMIN/GLOB SERPL: 2 G/DL
ALP SERPL-CCNC: 86 U/L (ref 39–117)
ALT SERPL W P-5'-P-CCNC: 13 U/L (ref 1–41)
ANION GAP SERPL CALCULATED.3IONS-SCNC: 8 MMOL/L (ref 5–15)
AST SERPL-CCNC: 35 U/L (ref 1–40)
BACTERIA UR QL AUTO: NORMAL /HPF
BILIRUB SERPL-MCNC: 0.5 MG/DL (ref 0–1.2)
BILIRUB UR QL STRIP: NEGATIVE
BUN SERPL-MCNC: 14 MG/DL (ref 6–20)
BUN/CREAT SERPL: 13.7 (ref 7–25)
CALCIUM SPEC-SCNC: 9.7 MG/DL (ref 8.6–10.5)
CHLORIDE SERPL-SCNC: 104 MMOL/L (ref 98–107)
CHOLEST SERPL-MCNC: 135 MG/DL (ref 0–200)
CLARITY UR: CLEAR
CO2 SERPL-SCNC: 26 MMOL/L (ref 22–29)
COLOR UR: YELLOW
CREAT SERPL-MCNC: 1.02 MG/DL (ref 0.76–1.27)
EGFRCR SERPLBLD CKD-EPI 2021: 92.9 ML/MIN/1.73
GLOBULIN UR ELPH-MCNC: 2.5 GM/DL
GLUCOSE SERPL-MCNC: 82 MG/DL (ref 65–99)
GLUCOSE UR STRIP-MCNC: NEGATIVE MG/DL
HDLC SERPL-MCNC: 46 MG/DL (ref 40–60)
HGB UR QL STRIP.AUTO: ABNORMAL
HYALINE CASTS UR QL AUTO: NORMAL /LPF
KETONES UR QL STRIP: NEGATIVE
LDLC SERPL CALC-MCNC: 77 MG/DL (ref 0–100)
LDLC/HDLC SERPL: 1.7 {RATIO}
LEUKOCYTE ESTERASE UR QL STRIP.AUTO: NEGATIVE
NITRITE UR QL STRIP: NEGATIVE
PH UR STRIP.AUTO: 6.5 [PH] (ref 5–8)
POTASSIUM SERPL-SCNC: 4.6 MMOL/L (ref 3.5–5.2)
PROT SERPL-MCNC: 7.5 G/DL (ref 6–8.5)
PROT UR QL STRIP: ABNORMAL
PSA SERPL-MCNC: 0.25 NG/ML (ref 0–4)
RBC # UR STRIP: NORMAL /HPF
REF LAB TEST METHOD: NORMAL
SODIUM SERPL-SCNC: 138 MMOL/L (ref 136–145)
SP GR UR STRIP: 1.03 (ref 1–1.03)
SQUAMOUS #/AREA URNS HPF: NORMAL /HPF
TRIGL SERPL-MCNC: 53 MG/DL (ref 0–150)
UROBILINOGEN UR QL STRIP: ABNORMAL
VLDLC SERPL-MCNC: 12 MG/DL (ref 5–40)
WBC # UR STRIP: NORMAL /HPF

## 2023-08-03 PROCEDURE — G0103 PSA SCREENING: HCPCS | Performed by: FAMILY MEDICINE

## 2023-08-03 PROCEDURE — 80061 LIPID PANEL: CPT | Performed by: FAMILY MEDICINE

## 2023-08-03 PROCEDURE — 80053 COMPREHEN METABOLIC PANEL: CPT | Performed by: FAMILY MEDICINE

## 2023-08-03 PROCEDURE — 82306 VITAMIN D 25 HYDROXY: CPT | Performed by: FAMILY MEDICINE

## 2023-08-03 PROCEDURE — 81001 URINALYSIS AUTO W/SCOPE: CPT | Performed by: FAMILY MEDICINE

## 2023-08-11 ENCOUNTER — OFFICE VISIT (OUTPATIENT)
Dept: FAMILY MEDICINE CLINIC | Facility: CLINIC | Age: 43
End: 2023-08-11
Payer: COMMERCIAL

## 2023-08-11 VITALS
BODY MASS INDEX: 27.3 KG/M2 | HEART RATE: 75 BPM | OXYGEN SATURATION: 95 % | DIASTOLIC BLOOD PRESSURE: 76 MMHG | TEMPERATURE: 97.1 F | HEIGHT: 73 IN | SYSTOLIC BLOOD PRESSURE: 114 MMHG | RESPIRATION RATE: 16 BRPM | WEIGHT: 206 LBS

## 2023-08-11 DIAGNOSIS — Z00.00 PREVENTATIVE HEALTH CARE: Primary | ICD-10-CM

## 2023-08-11 PROBLEM — Z11.59 NEED FOR HEPATITIS C SCREENING TEST: Status: RESOLVED | Noted: 2021-07-13 | Resolved: 2023-08-11

## 2023-08-11 PROCEDURE — 99396 PREV VISIT EST AGE 40-64: CPT | Performed by: FAMILY MEDICINE

## 2023-08-11 RX ORDER — ESCITALOPRAM OXALATE 20 MG/1
20 TABLET ORAL DAILY
Qty: 90 TABLET | Refills: 2 | Status: SHIPPED | OUTPATIENT
Start: 2023-08-11

## 2023-08-11 RX ORDER — ERGOCALCIFEROL 1.25 MG/1
50000 CAPSULE ORAL WEEKLY
Qty: 12 CAPSULE | Refills: 1 | Status: SHIPPED | OUTPATIENT
Start: 2023-08-11

## 2023-08-11 RX ORDER — MULTIPLE VITAMINS W/ MINERALS TAB 9MG-400MCG
1 TAB ORAL DAILY
COMMUNITY

## 2023-08-11 NOTE — PROGRESS NOTES
Subjective   Chief Complaint   Patient presents with    Annual Exam     Antonio Evans is a 43 y.o. male.     Patient Care Team:  Kristine Pfeiffer MD as PCP - General (Family Medicine)    History of Present Illness  He is coming in today for his annual wellness exam.  He also recently had fasting blood work done and these results are being reviewed today.  He reports doing well and denies any new issues or concerns.  He works full-time.  He is also staying pretty physically active.  He is an  for his oldest child soccer team.  He is on Lexapro and the medication is working for him well and he would like to continue.  He is not a smoker. Patient does not report any chest pain, shortness of breath, dizziness, nausea, vomiting, or diarrhea, visual issues, headaches, numbness or tingling. No urinary issues reported like urgency, frequency, or discomfort upon urination.  No significant weight changes reported.  No swelling reported.  No rashes or any other skin issues reported. No emotional issues or insomnia.       The following portions of the patient's history were reviewed and updated as appropriate: allergies, current medications, past family history, past medical history, past social history, past surgical history, and problem list.  Past Medical History:   Diagnosis Date    Allergic rhinitis     Anxiety     Hemorrhoids      Past Surgical History:   Procedure Laterality Date    HEMORRHOIDECTOMY      RECTAL EXAMINATION UNDER ANESTHESIA N/A 8/6/2020    Procedure: RECTAL EXAM UNDER ANESTHESIA  AND SETON PLACEMENT;  Surgeon: Martin Lovett MD;  Location: [facility] OR;  Service: General;  Laterality: N/A;    TEMPOROMANDIBULAR JOINT ARTHROPLASTY  1997    TONSILLECTOMY      VARICOCELE EXCISION  1997     The patient has a family history of  Family History   Problem Relation Age of Onset    Mitral valve prolapse Mother      Social History     Socioeconomic History    Marital status:   "  Tobacco Use    Smoking status: Never    Smokeless tobacco: Never   Substance and Sexual Activity    Alcohol use: Yes     Alcohol/week: 8.0 standard drinks     Types: 8 Cans of beer per week    Drug use: No    Sexual activity: Yes       Review of Systems   Constitutional:  Negative for activity change, appetite change, chills, fatigue, fever, unexpected weight gain and unexpected weight loss.   HENT:  Negative for congestion, ear pain, hearing loss, nosebleeds, postnasal drip, swollen glands, tinnitus and trouble swallowing.    Eyes:  Negative for blurred vision, double vision and visual disturbance.   Respiratory:  Negative for cough, choking, chest tightness, shortness of breath, wheezing and stridor.    Cardiovascular:  Negative for chest pain, palpitations and leg swelling.   Gastrointestinal:  Negative for abdominal distention, abdominal pain, anal bleeding, constipation, diarrhea, nausea, vomiting and GERD.   Endocrine: Negative for cold intolerance, heat intolerance and polyphagia.   Genitourinary:  Negative for dysuria, flank pain, frequency, hematuria and urgency.   Musculoskeletal:  Negative for arthralgias, back pain, gait problem, joint swelling and neck pain.   Skin:  Negative for color change, dry skin and skin lesions.   Allergic/Immunologic: Negative for environmental allergies and food allergies.   Neurological:  Negative for dizziness, tremors, speech difficulty, light-headedness, numbness, headache and confusion.   Hematological:  Negative for adenopathy. Does not bruise/bleed easily.   Psychiatric/Behavioral:  Negative for decreased concentration, sleep disturbance, depressed mood and stress.    Visit Vitals  /76 (BP Location: Left arm, Patient Position: Sitting, Cuff Size: Adult)   Pulse 75   Temp 97.1 øF (36.2 øC) (Infrared)   Resp 16   Ht 185.4 cm (73\")   Wt 93.4 kg (206 lb)   SpO2 95%   BMI 27.18 kg/mý       BMI is >= 25 and <30. (Overweight) The following options were offered after " discussion;: exercise counseling/recommendations      Current Outpatient Medications:     escitalopram (LEXAPRO) 20 MG tablet, Take 1 tablet by mouth Daily., Disp: 90 tablet, Rfl: 2    multivitamin with minerals tablet tablet, Take 1 tablet by mouth Daily., Disp: , Rfl:     vitamin D (ERGOCALCIFEROL) 1.25 MG (55073 UT) capsule capsule, Take 1 capsule by mouth 1 (One) Time Per Week., Disp: 12 capsule, Rfl: 1    Objective   Physical Exam  Vitals and nursing note reviewed.   Constitutional:       General: He is not in acute distress.     Appearance: He is well-developed. He is not diaphoretic.   HENT:      Head: Normocephalic and atraumatic.      Right Ear: External ear normal.      Left Ear: External ear normal.   Eyes:      General: No scleral icterus.        Right eye: No discharge.         Left eye: No discharge.      Conjunctiva/sclera: Conjunctivae normal.      Pupils: Pupils are equal, round, and reactive to light.   Neck:      Thyroid: No thyromegaly.      Vascular: No JVD.   Cardiovascular:      Rate and Rhythm: Normal rate and regular rhythm.      Heart sounds: Normal heart sounds. No murmur heard.    No friction rub. No gallop.   Pulmonary:      Effort: Pulmonary effort is normal. No respiratory distress.      Breath sounds: Normal breath sounds. No stridor. No wheezing, rhonchi or rales.   Abdominal:      General: Bowel sounds are normal. There is no distension.      Palpations: Abdomen is soft. There is no mass.      Tenderness: There is no abdominal tenderness. There is no guarding or rebound.      Hernia: No hernia is present.   Musculoskeletal:         General: No swelling, tenderness or deformity. Normal range of motion.      Cervical back: Normal range of motion and neck supple. No muscular tenderness.      Right lower leg: No edema.      Left lower leg: No edema.   Lymphadenopathy:      Cervical: No cervical adenopathy.   Skin:     General: Skin is warm and dry.      Capillary Refill: Capillary  refill takes less than 2 seconds.      Coloration: Skin is not pale.      Findings: No bruising, erythema, lesion or rash.   Neurological:      General: No focal deficit present.      Mental Status: He is alert and oriented to person, place, and time.      Cranial Nerves: No cranial nerve deficit.      Sensory: No sensory deficit.      Motor: No weakness.      Coordination: Coordination normal.      Deep Tendon Reflexes: Reflexes normal.   Psychiatric:         Mood and Affect: Mood normal.         Behavior: Behavior normal.         Judgment: Judgment normal.       Assessment & Plan   Diagnoses and all orders for this visit:    1. Preventative health care (Primary)    Other orders  -     vitamin D (ERGOCALCIFEROL) 1.25 MG (20977 UT) capsule capsule; Take 1 capsule by mouth 1 (One) Time Per Week.  Dispense: 12 capsule; Refill: 1  -     escitalopram (LEXAPRO) 20 MG tablet; Take 1 tablet by mouth Daily.  Dispense: 90 tablet; Refill: 2      Wellness exam was done today - see above for details. Healthy life style was reviewed and discussed and re-enforced. Regular exercise and healthy diet were also discussed and recommended.  I reviewed and discussed with the patient his recent fasting blood work results.  His vitamin D level is low.  I am starting him back on a high-dose of vitamin D once a week and patient was advised to start 2,000 units of vitamin D once he is done with his high-dose.  We also discussed colon cancer screening and patient was advised that this is recommended to start at the age of 45 which will be next year for him.      Return in about 1 year (around 8/11/2024) for Annual physical.    Requested Prescriptions     Signed Prescriptions Disp Refills    vitamin D (ERGOCALCIFEROL) 1.25 MG (49633 UT) capsule capsule 12 capsule 1     Sig: Take 1 capsule by mouth 1 (One) Time Per Week.    escitalopram (LEXAPRO) 20 MG tablet 90 tablet 2     Sig: Take 1 tablet by mouth Daily.

## 2023-08-21 ENCOUNTER — TELEPHONE (OUTPATIENT)
Dept: FAMILY MEDICINE CLINIC | Facility: CLINIC | Age: 43
End: 2023-08-21

## 2023-08-21 NOTE — TELEPHONE ENCOUNTER
Patient was notified and informed form was faxed to the fax number on the paperwork this morning. He said that was perfect

## 2023-08-21 NOTE — TELEPHONE ENCOUNTER
"    Caller: Antonio Evans \"Srikanth\"    Relationship to patient: Self    Best call back number: [phone]    Patient is needing:     CALLING TO CHECK ON THE STATUS OF THE FORM HE UPLOADED THROUGH [employer].    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    COMPLETED FORM IS OK TO UPLOAD IN HIS [facility].           "

## 2024-02-05 RX ORDER — ERGOCALCIFEROL 1.25 MG/1
50000 CAPSULE ORAL WEEKLY
Qty: 12 CAPSULE | Refills: 1 | Status: SHIPPED | OUTPATIENT
Start: 2024-02-05

## 2024-05-07 RX ORDER — ESCITALOPRAM OXALATE 20 MG/1
20 TABLET ORAL DAILY
Qty: 90 TABLET | Refills: 0 | Status: SHIPPED | OUTPATIENT
Start: 2024-05-07

## 2024-07-31 ENCOUNTER — TELEPHONE (OUTPATIENT)
Dept: FAMILY MEDICINE CLINIC | Facility: CLINIC | Age: 44
End: 2024-07-31

## 2024-07-31 DIAGNOSIS — E55.9 VITAMIN D DEFICIENCY: ICD-10-CM

## 2024-07-31 DIAGNOSIS — Z00.00 PREVENTATIVE HEALTH CARE: Primary | ICD-10-CM

## 2024-07-31 DIAGNOSIS — Z12.5 PROSTATE CANCER SCREENING: ICD-10-CM

## 2024-07-31 NOTE — TELEPHONE ENCOUNTER
"  Caller: Antonio Evans \"Shanelle\"    Relationship: Self    Best call back number: [phone]     What is the best time to reach you: ANY    Who are you requesting to speak with (clinical staff, provider,  specific staff member): CLINICAL    Do you know the name of the person who called: SHANELLE    What was the call regarding: PATIENT HAS PHYSICAL SCHEDULED FOR 08/12 AND WOULD LIKE TO DO LABS PRIOR. PLEASE ORDER AND CALL PATIENT TO SCHEDULE    "

## 2024-08-06 ENCOUNTER — LAB (OUTPATIENT)
Dept: FAMILY MEDICINE CLINIC | Facility: CLINIC | Age: 44
End: 2024-08-06
Payer: COMMERCIAL

## 2024-08-06 LAB
25(OH)D3 SERPL-MCNC: 53.6 NG/ML (ref 30–100)
ALBUMIN SERPL-MCNC: 4.4 G/DL (ref 3.5–5.2)
ALBUMIN/GLOB SERPL: 1.7 G/DL
ALP SERPL-CCNC: 85 U/L (ref 39–117)
ALT SERPL W P-5'-P-CCNC: 13 U/L (ref 1–41)
ANION GAP SERPL CALCULATED.3IONS-SCNC: 7 MMOL/L (ref 5–15)
AST SERPL-CCNC: 33 U/L (ref 1–40)
BASOPHILS # BLD AUTO: 0.06 10*3/MM3 (ref 0–0.2)
BASOPHILS NFR BLD AUTO: 0.9 % (ref 0–1.5)
BILIRUB SERPL-MCNC: 0.6 MG/DL (ref 0–1.2)
BUN SERPL-MCNC: 12 MG/DL (ref 6–20)
BUN/CREAT SERPL: 12.6 (ref 7–25)
CALCIUM SPEC-SCNC: 9.2 MG/DL (ref 8.6–10.5)
CHLORIDE SERPL-SCNC: 107 MMOL/L (ref 98–107)
CHOLEST SERPL-MCNC: 144 MG/DL (ref 0–200)
CO2 SERPL-SCNC: 26 MMOL/L (ref 22–29)
CREAT SERPL-MCNC: 0.95 MG/DL (ref 0.76–1.27)
DEPRECATED RDW RBC AUTO: 42.4 FL (ref 37–54)
EGFRCR SERPLBLD CKD-EPI 2021: 100.6 ML/MIN/1.73
EOSINOPHIL # BLD AUTO: 0.25 10*3/MM3 (ref 0–0.4)
EOSINOPHIL NFR BLD AUTO: 3.9 % (ref 0.3–6.2)
ERYTHROCYTE [DISTWIDTH] IN BLOOD BY AUTOMATED COUNT: 12.9 % (ref 12.3–15.4)
GLOBULIN UR ELPH-MCNC: 2.6 GM/DL
GLUCOSE SERPL-MCNC: 92 MG/DL (ref 65–99)
HCT VFR BLD AUTO: 41.8 % (ref 37.5–51)
HDLC SERPL-MCNC: 40 MG/DL (ref 40–60)
HGB BLD-MCNC: 14 G/DL (ref 13–17.7)
IMM GRANULOCYTES # BLD AUTO: 0.02 10*3/MM3 (ref 0–0.05)
IMM GRANULOCYTES NFR BLD AUTO: 0.3 % (ref 0–0.5)
LDLC SERPL CALC-MCNC: 88 MG/DL (ref 0–100)
LDLC/HDLC SERPL: 2.2 {RATIO}
LYMPHOCYTES # BLD AUTO: 2.21 10*3/MM3 (ref 0.7–3.1)
LYMPHOCYTES NFR BLD AUTO: 34.5 % (ref 19.6–45.3)
MCH RBC QN AUTO: 30.7 PG (ref 26.6–33)
MCHC RBC AUTO-ENTMCNC: 33.5 G/DL (ref 31.5–35.7)
MCV RBC AUTO: 91.7 FL (ref 79–97)
MONOCYTES # BLD AUTO: 0.46 10*3/MM3 (ref 0.1–0.9)
MONOCYTES NFR BLD AUTO: 7.2 % (ref 5–12)
NEUTROPHILS NFR BLD AUTO: 3.4 10*3/MM3 (ref 1.7–7)
NEUTROPHILS NFR BLD AUTO: 53.2 % (ref 42.7–76)
NRBC BLD AUTO-RTO: 0 /100 WBC (ref 0–0.2)
PLATELET # BLD AUTO: 219 10*3/MM3 (ref 140–450)
PMV BLD AUTO: 9.1 FL (ref 6–12)
POTASSIUM SERPL-SCNC: 4.8 MMOL/L (ref 3.5–5.2)
PROT SERPL-MCNC: 7 G/DL (ref 6–8.5)
PSA SERPL-MCNC: 0.25 NG/ML (ref 0–4)
RBC # BLD AUTO: 4.56 10*6/MM3 (ref 4.14–5.8)
SODIUM SERPL-SCNC: 140 MMOL/L (ref 136–145)
TRIGL SERPL-MCNC: 80 MG/DL (ref 0–150)
TSH SERPL DL<=0.05 MIU/L-ACNC: 1.96 UIU/ML (ref 0.27–4.2)
VLDLC SERPL-MCNC: 16 MG/DL (ref 5–40)
WBC NRBC COR # BLD AUTO: 6.4 10*3/MM3 (ref 3.4–10.8)

## 2024-08-06 PROCEDURE — 82306 VITAMIN D 25 HYDROXY: CPT | Performed by: FAMILY MEDICINE

## 2024-08-06 PROCEDURE — 80050 GENERAL HEALTH PANEL: CPT | Performed by: FAMILY MEDICINE

## 2024-08-06 PROCEDURE — 80061 LIPID PANEL: CPT | Performed by: FAMILY MEDICINE

## 2024-08-06 PROCEDURE — G0103 PSA SCREENING: HCPCS | Performed by: FAMILY MEDICINE

## 2024-08-13 ENCOUNTER — OFFICE VISIT (OUTPATIENT)
Dept: FAMILY MEDICINE CLINIC | Facility: CLINIC | Age: 44
End: 2024-08-13
Payer: COMMERCIAL

## 2024-08-13 VITALS
TEMPERATURE: 97.5 F | BODY MASS INDEX: 27.3 KG/M2 | HEIGHT: 73 IN | HEART RATE: 77 BPM | SYSTOLIC BLOOD PRESSURE: 107 MMHG | RESPIRATION RATE: 16 BRPM | DIASTOLIC BLOOD PRESSURE: 72 MMHG | WEIGHT: 206 LBS | OXYGEN SATURATION: 95 %

## 2024-08-13 DIAGNOSIS — Z12.11 COLON CANCER SCREENING: ICD-10-CM

## 2024-08-13 DIAGNOSIS — Z00.00 PREVENTATIVE HEALTH CARE: Primary | ICD-10-CM

## 2024-08-13 PROCEDURE — 99396 PREV VISIT EST AGE 40-64: CPT | Performed by: FAMILY MEDICINE

## 2024-08-13 RX ORDER — ESCITALOPRAM OXALATE 20 MG/1
20 TABLET ORAL DAILY
Qty: 90 TABLET | Refills: 3 | Status: SHIPPED | OUTPATIENT
Start: 2024-08-13

## 2024-08-13 NOTE — PROGRESS NOTES
Subjective   Chief Complaint   Patient presents with    Annual Exam     Antonio Evans is a 44 y.o. male.     Patient Care Team:  Kristine Pfeiffer MD as PCP - General (Family Medicine)    History of Present Illness  He is coming in today for his annual wellness exam.  He reports overall doing well and denies any new issues or concerns.  He recently had fasting blood work done and these results are being reviewed.  Patient is on Lexapro and has been on it for several years and reports doing very well with that and wants to continue the medication. Patient does not report any chest pain, shortness of breath, dizziness, nausea, vomiting, or diarrhea, visual issues, headaches, numbness or tingling. No urinary issues reported like urgency, frequency, or discomfort upon urination.  No significant weight changes reported.  No swelling reported.  No rashes or any other skin issues reported. No emotional issues or insomnia.       The following portions of the patient's history were reviewed and updated as appropriate: allergies, current medications, past family history, past medical history, past social history, past surgical history, and problem list.  Past Medical History:   Diagnosis Date    Allergic rhinitis     Anxiety     Hemorrhoids      Past Surgical History:   Procedure Laterality Date    HEMORRHOIDECTOMY      RECTAL EXAMINATION UNDER ANESTHESIA N/A 8/6/2020    Procedure: RECTAL EXAM UNDER ANESTHESIA  AND SETON PLACEMENT;  Surgeon: Martin Lovett MD;  Location: [facility];  Service: General;  Laterality: N/A;    TEMPOROMANDIBULAR JOINT ARTHROPLASTY  1997    TONSILLECTOMY      VARICOCELE EXCISION  1997     The patient has a family history of  Family History   Problem Relation Age of Onset    Mitral valve prolapse Mother      Social History     Socioeconomic History    Marital status:    Tobacco Use    Smoking status: Never     Passive exposure: Never    Smokeless tobacco: Never   Vaping Use     "Vaping status: Never Used   Substance and Sexual Activity    Alcohol use: Yes     Alcohol/week: 8.0 standard drinks of alcohol     Types: 8 Cans of beer per week    Drug use: No    Sexual activity: Yes       Review of Systems   Constitutional:  Negative for activity change, appetite change, chills, fatigue, fever, unexpected weight gain and unexpected weight loss.   HENT:  Negative for congestion, ear pain, hearing loss, nosebleeds, postnasal drip, swollen glands, tinnitus and trouble swallowing.    Eyes:  Negative for blurred vision, double vision and visual disturbance.   Respiratory:  Negative for cough, choking, chest tightness, shortness of breath, wheezing and stridor.    Cardiovascular:  Negative for chest pain, palpitations and leg swelling.   Gastrointestinal:  Negative for abdominal distention, abdominal pain, anal bleeding, constipation, diarrhea, nausea, vomiting and GERD.   Endocrine: Negative for cold intolerance, heat intolerance and polyphagia.   Genitourinary:  Negative for dysuria, flank pain, frequency, hematuria and urgency.   Musculoskeletal:  Negative for arthralgias, back pain, gait problem, joint swelling and neck pain.   Skin:  Negative for color change, dry skin and skin lesions.   Allergic/Immunologic: Negative for environmental allergies and food allergies.   Neurological:  Negative for dizziness, tremors, speech difficulty, light-headedness, numbness, headache and confusion.   Hematological:  Negative for adenopathy. Does not bruise/bleed easily.   Psychiatric/Behavioral:  Negative for decreased concentration, sleep disturbance, depressed mood and stress.      Visit Vitals  /72 (BP Location: Left arm, Patient Position: Sitting, Cuff Size: Adult)   Pulse 77   Temp 97.5 °F (36.4 °C) (Infrared)   Resp 16   Ht 185.4 cm (72.99\")   Wt 93.4 kg (206 lb)   SpO2 95%   BMI 27.18 kg/m²       BMI is >= 25 and <30. (Overweight) The following options were offered after discussion;: exercise " counseling/recommendations      Current Outpatient Medications:     escitalopram (LEXAPRO) 20 MG tablet, Take 1 tablet by mouth Daily., Disp: 90 tablet, Rfl: 3    multivitamin with minerals tablet tablet, Take 1 tablet by mouth Daily., Disp: , Rfl:     vitamin D (ERGOCALCIFEROL) 1.25 MG (96059 UT) capsule capsule, TAKE 1 CAPSULE BY MOUTH 1 TIME EVERY WEEK, Disp: 12 capsule, Rfl: 1    Objective   Physical Exam  Vitals and nursing note reviewed.   Constitutional:       General: He is not in acute distress.     Appearance: He is well-developed. He is not diaphoretic.   HENT:      Head: Normocephalic and atraumatic.      Right Ear: External ear normal.      Left Ear: External ear normal.   Eyes:      General: No scleral icterus.        Right eye: No discharge.         Left eye: No discharge.      Conjunctiva/sclera: Conjunctivae normal.      Pupils: Pupils are equal, round, and reactive to light.   Neck:      Thyroid: No thyromegaly.      Vascular: No JVD.   Cardiovascular:      Rate and Rhythm: Normal rate and regular rhythm.      Heart sounds: Normal heart sounds. No murmur heard.     No friction rub. No gallop.   Pulmonary:      Effort: Pulmonary effort is normal. No respiratory distress.      Breath sounds: Normal breath sounds. No stridor. No wheezing, rhonchi or rales.   Abdominal:      General: Bowel sounds are normal. There is no distension.      Palpations: Abdomen is soft. There is no mass.      Tenderness: There is no abdominal tenderness. There is no guarding or rebound.      Hernia: No hernia is present.   Musculoskeletal:         General: No swelling, tenderness or deformity. Normal range of motion.      Cervical back: Normal range of motion and neck supple. No muscular tenderness.      Right lower leg: No edema.      Left lower leg: No edema.   Lymphadenopathy:      Cervical: No cervical adenopathy.   Skin:     General: Skin is warm and dry.      Capillary Refill: Capillary refill takes less than 2  seconds.      Coloration: Skin is not pale.      Findings: No bruising, erythema, lesion or rash.   Neurological:      General: No focal deficit present.      Mental Status: He is alert and oriented to person, place, and time.      Cranial Nerves: No cranial nerve deficit.      Sensory: No sensory deficit.      Motor: No weakness.      Coordination: Coordination normal.      Deep Tendon Reflexes: Reflexes normal.   Psychiatric:         Mood and Affect: Mood normal.         Behavior: Behavior normal.         Judgment: Judgment normal.         Assessment & Plan   Diagnoses and all orders for this visit:    1. Preventative health care (Primary)    2. Colon cancer screening  -     Cologuard - Stool, Per Rectum; Future    Other orders  -     escitalopram (LEXAPRO) 20 MG tablet; Take 1 tablet by mouth Daily.  Dispense: 90 tablet; Refill: 3      Wellness exam was done today - see above for details. Healthy life style was reviewed and discussed and re-enforced. Regular exercise and healthy diet were also discussed and recommended.  I reviewed and discussed with the patient his recent fasting blood work results.  Colon cancer screening was also discussed and recommended.  We talked about colonoscopy and a Cologuard and patient prefers to proceed with a Cologuard.  Refill for Lexapro was given.      Return in about 1 year (around 8/13/2025) for Annual physical.    Requested Prescriptions     Signed Prescriptions Disp Refills    escitalopram (LEXAPRO) 20 MG tablet 90 tablet 3     Sig: Take 1 tablet by mouth Daily.       Kristine Pfeiffer MD  08/13/2024

## 2024-10-22 ENCOUNTER — OFFICE VISIT (OUTPATIENT)
Dept: FAMILY MEDICINE CLINIC | Facility: CLINIC | Age: 44
End: 2024-10-22
Payer: COMMERCIAL

## 2024-10-22 VITALS
HEART RATE: 81 BPM | HEIGHT: 73 IN | BODY MASS INDEX: 27.62 KG/M2 | TEMPERATURE: 97.8 F | OXYGEN SATURATION: 93 % | WEIGHT: 208.4 LBS | DIASTOLIC BLOOD PRESSURE: 87 MMHG | SYSTOLIC BLOOD PRESSURE: 139 MMHG

## 2024-10-22 DIAGNOSIS — J40 BRONCHITIS: Primary | ICD-10-CM

## 2024-10-22 PROCEDURE — 99213 OFFICE O/P EST LOW 20 MIN: CPT | Performed by: FAMILY MEDICINE

## 2024-10-22 RX ORDER — LEVOFLOXACIN 750 MG/1
750 TABLET, FILM COATED ORAL DAILY
Qty: 7 TABLET | Refills: 0 | Status: SHIPPED | OUTPATIENT
Start: 2024-10-22

## 2024-10-22 NOTE — PROGRESS NOTES
"Chief Complaint  Cough (Aches, chills and fever but has since subsided as of last week persistent cough since )    Subjective        Antonio Evans presents to [facility]  History of Present Illness  He had flu-like symptoms last week - body aches, cough, fever, chills.  He is now feeling better except for non-productive cough.   Cough  This is a new problem. The current episode started in the past 7 days. The problem has been worse. The problem occurs every few minutes. The cough is Dry. Associated symptoms include wheezing. Pertinent negatives include no chest pain, chills, ear pain, fever, headaches or heartburn. He has tried nothing for the symptoms.       Objective   Vital Signs:  /87 (BP Location: Left arm, Patient Position: Sitting, Cuff Size: Adult)   Pulse 81   Temp 97.8 °F (36.6 °C) (Infrared)   Ht 185.4 cm (73\")   Wt 94.5 kg (208 lb 6.4 oz)   SpO2 93%   BMI 27.50 kg/m²   Estimated body mass index is 27.5 kg/m² as calculated from the following:    Height as of this encounter: 185.4 cm (73\").    Weight as of this encounter: 94.5 kg (208 lb 6.4 oz).            Physical Exam  Vitals and nursing note reviewed.   Constitutional:       General: He is not in acute distress.     Appearance: He is well-developed.   HENT:      Head: Normocephalic.   Eyes:      General: Lids are normal.      Conjunctiva/sclera: Conjunctivae normal.   Neck:      Thyroid: No thyroid mass or thyromegaly.      Trachea: Trachea normal.   Cardiovascular:      Rate and Rhythm: Normal rate and regular rhythm.      Heart sounds: Normal heart sounds.   Pulmonary:      Effort: Pulmonary effort is normal.      Breath sounds: Wheezing and rhonchi present.   Musculoskeletal:      Cervical back: Normal range of motion.   Lymphadenopathy:      Cervical: No cervical adenopathy.   Skin:     General: Skin is warm and dry.   Neurological:      Mental Status: He is alert and oriented to person, place, and " time.   Psychiatric:         Attention and Perception: He is attentive.         Mood and Affect: Mood normal.         Speech: Speech normal.         Behavior: Behavior normal.        Result Review :  The following data was reviewed by: Jaylyn Philip MD on 10/22/2024:  Common labs          8/6/2024    09:10   Common Labs   Glucose 92    BUN 12    Creatinine 0.95    Sodium 140    Potassium 4.8    Chloride 107    Calcium 9.2    Albumin 4.4    Total Bilirubin 0.6    Alkaline Phosphatase 85    AST (SGOT) 33    ALT (SGPT) 13    WBC 6.40    Hemoglobin 14.0    Hematocrit 41.8    Platelets 219    Total Cholesterol 144    Triglycerides 80    HDL Cholesterol 40    LDL Cholesterol  88    PSA 0.248                Assessment and Plan   Diagnoses and all orders for this visit:    1. Bronchitis (Primary)  Assessment & Plan:  Mucinex over the counter as needed for cough or congestion.  Tylenol 1-2 tabs po q4h prn  If no better in a few days then consider chest x-ray.       Orders:  -     levoFLOXacin (Levaquin) 750 MG tablet; Take 1 tablet by mouth Daily.  Dispense: 7 tablet; Refill: 0             Follow Up   No follow-ups on file.  Patient was given instructions and counseling regarding his condition or for health maintenance advice. Please see specific information pulled into the AVS if appropriate.

## 2024-10-22 NOTE — ASSESSMENT & PLAN NOTE
Mucinex over the counter as needed for cough or congestion.  Tylenol 1-2 tabs po q4h prn  If no better in a few days then consider chest x-ray.

## 2025-08-08 DIAGNOSIS — Z00.00 PREVENTATIVE HEALTH CARE: Primary | ICD-10-CM

## 2025-08-08 DIAGNOSIS — Z12.5 PROSTATE CANCER SCREENING: ICD-10-CM

## 2025-08-10 RX ORDER — ESCITALOPRAM OXALATE 20 MG/1
20 TABLET ORAL DAILY
Qty: 90 TABLET | Refills: 0 | Status: SHIPPED | OUTPATIENT
Start: 2025-08-10

## 2025-08-13 ENCOUNTER — LAB (OUTPATIENT)
Dept: FAMILY MEDICINE CLINIC | Facility: CLINIC | Age: 45
End: 2025-08-13
Payer: COMMERCIAL

## 2025-08-13 LAB
ALBUMIN SERPL-MCNC: 4.4 G/DL (ref 3.5–5.2)
ALBUMIN/GLOB SERPL: 1.5 G/DL
ALP SERPL-CCNC: 92 U/L (ref 39–117)
ALT SERPL W P-5'-P-CCNC: 10 U/L (ref 1–41)
ANION GAP SERPL CALCULATED.3IONS-SCNC: 10.9 MMOL/L (ref 5–15)
AST SERPL-CCNC: 33 U/L (ref 1–40)
BASOPHILS # BLD AUTO: 0.04 10*3/MM3 (ref 0–0.2)
BASOPHILS NFR BLD AUTO: 0.6 % (ref 0–1.5)
BILIRUB SERPL-MCNC: 0.4 MG/DL (ref 0–1.2)
BUN SERPL-MCNC: 16 MG/DL (ref 6–20)
BUN/CREAT SERPL: 17.4 (ref 7–25)
CALCIUM SPEC-SCNC: 9.3 MG/DL (ref 8.6–10.5)
CHLORIDE SERPL-SCNC: 107 MMOL/L (ref 98–107)
CHOLEST SERPL-MCNC: 151 MG/DL (ref 0–200)
CO2 SERPL-SCNC: 24.1 MMOL/L (ref 22–29)
CREAT SERPL-MCNC: 0.92 MG/DL (ref 0.76–1.27)
DEPRECATED RDW RBC AUTO: 43.5 FL (ref 37–54)
EGFRCR SERPLBLD CKD-EPI 2021: 103.9 ML/MIN/1.73
EOSINOPHIL # BLD AUTO: 0.18 10*3/MM3 (ref 0–0.4)
EOSINOPHIL NFR BLD AUTO: 2.7 % (ref 0.3–6.2)
ERYTHROCYTE [DISTWIDTH] IN BLOOD BY AUTOMATED COUNT: 13 % (ref 12.3–15.4)
GLOBULIN UR ELPH-MCNC: 2.9 GM/DL
GLUCOSE SERPL-MCNC: 83 MG/DL (ref 65–99)
HCT VFR BLD AUTO: 42 % (ref 37.5–51)
HDLC SERPL-MCNC: 36 MG/DL (ref 40–60)
HGB BLD-MCNC: 14.3 G/DL (ref 13–17.7)
IMM GRANULOCYTES # BLD AUTO: 0.01 10*3/MM3 (ref 0–0.05)
IMM GRANULOCYTES NFR BLD AUTO: 0.2 % (ref 0–0.5)
LDLC SERPL CALC-MCNC: 97 MG/DL (ref 0–100)
LDLC/HDLC SERPL: 2.66 {RATIO}
LYMPHOCYTES # BLD AUTO: 2.17 10*3/MM3 (ref 0.7–3.1)
LYMPHOCYTES NFR BLD AUTO: 33 % (ref 19.6–45.3)
MCH RBC QN AUTO: 31.4 PG (ref 26.6–33)
MCHC RBC AUTO-ENTMCNC: 34 G/DL (ref 31.5–35.7)
MCV RBC AUTO: 92.1 FL (ref 79–97)
MONOCYTES # BLD AUTO: 0.44 10*3/MM3 (ref 0.1–0.9)
MONOCYTES NFR BLD AUTO: 6.7 % (ref 5–12)
NEUTROPHILS NFR BLD AUTO: 3.74 10*3/MM3 (ref 1.7–7)
NEUTROPHILS NFR BLD AUTO: 56.8 % (ref 42.7–76)
NRBC BLD AUTO-RTO: 0 /100 WBC (ref 0–0.2)
PLATELET # BLD AUTO: 227 10*3/MM3 (ref 140–450)
PMV BLD AUTO: 8.9 FL (ref 6–12)
POTASSIUM SERPL-SCNC: 4.5 MMOL/L (ref 3.5–5.2)
PROT SERPL-MCNC: 7.3 G/DL (ref 6–8.5)
PSA SERPL-MCNC: 0.37 NG/ML (ref 0–4)
RBC # BLD AUTO: 4.56 10*6/MM3 (ref 4.14–5.8)
SODIUM SERPL-SCNC: 142 MMOL/L (ref 136–145)
TRIGL SERPL-MCNC: 96 MG/DL (ref 0–150)
VLDLC SERPL-MCNC: 18 MG/DL (ref 5–40)
WBC NRBC COR # BLD AUTO: 6.58 10*3/MM3 (ref 3.4–10.8)

## 2025-08-13 PROCEDURE — 80061 LIPID PANEL: CPT | Performed by: FAMILY MEDICINE

## 2025-08-13 PROCEDURE — G0103 PSA SCREENING: HCPCS | Performed by: FAMILY MEDICINE

## 2025-08-13 PROCEDURE — 85025 COMPLETE CBC W/AUTO DIFF WBC: CPT | Performed by: FAMILY MEDICINE

## 2025-08-13 PROCEDURE — 80053 COMPREHEN METABOLIC PANEL: CPT | Performed by: FAMILY MEDICINE

## 2025-08-18 ENCOUNTER — OFFICE VISIT (OUTPATIENT)
Dept: FAMILY MEDICINE CLINIC | Facility: CLINIC | Age: 45
End: 2025-08-18
Payer: COMMERCIAL

## 2025-08-18 VITALS
DIASTOLIC BLOOD PRESSURE: 82 MMHG | TEMPERATURE: 98.7 F | RESPIRATION RATE: 16 BRPM | HEIGHT: 73 IN | SYSTOLIC BLOOD PRESSURE: 128 MMHG | BODY MASS INDEX: 29.79 KG/M2 | HEART RATE: 72 BPM | WEIGHT: 224.8 LBS | OXYGEN SATURATION: 97 %

## 2025-08-18 DIAGNOSIS — Z00.00 PREVENTATIVE HEALTH CARE: Primary | ICD-10-CM

## 2025-08-18 PROBLEM — J40 BRONCHITIS: Status: RESOLVED | Noted: 2019-07-15 | Resolved: 2025-08-18

## 2025-08-18 PROBLEM — K61.0 PERIANAL ABSCESS: Status: RESOLVED | Noted: 2020-07-29 | Resolved: 2025-08-18

## 2025-08-18 PROCEDURE — 99396 PREV VISIT EST AGE 40-64: CPT | Performed by: FAMILY MEDICINE

## (undated) DEVICE — GLV SURG SENSICARE SLT PF LF 8 STRL

## (undated) DEVICE — SOL IRRIG H2O 1000ML STRL

## (undated) DEVICE — LP VESL MAXI 2.5X1MM RED 2PK

## (undated) DEVICE — ELECTRD NDL EZ CLN STD 6.5IN

## (undated) DEVICE — SUT SILK 2/0 FS BLK 18IN 685G

## (undated) DEVICE — PK PROC TURNOVER

## (undated) DEVICE — TP SXN YANKR BULB STRL

## (undated) DEVICE — LUBE JELLY SURGILUBE TB/FLIPCAP 4.25OZ

## (undated) DEVICE — PANTY KNIT MATERN L/XL

## (undated) DEVICE — IRRIGATOR BULB ASEPTO 60CC STRL

## (undated) DEVICE — GOWN,REINFRCE,POLY,SIRUS,BREATH SLV,XXLG: Brand: MEDLINE

## (undated) DEVICE — PK MINOR LITHOTOMY 50

## (undated) DEVICE — GLV SURG DERMASSURE GRN LF PF 8.5